# Patient Record
Sex: MALE | Race: WHITE | Employment: OTHER | URBAN - NONMETROPOLITAN AREA
[De-identification: names, ages, dates, MRNs, and addresses within clinical notes are randomized per-mention and may not be internally consistent; named-entity substitution may affect disease eponyms.]

---

## 2017-05-30 ENCOUNTER — OFFICE VISIT (OUTPATIENT)
Dept: SURGERY | Age: 42
End: 2017-05-30
Payer: MEDICAID

## 2017-05-30 VITALS
DIASTOLIC BLOOD PRESSURE: 80 MMHG | WEIGHT: 259 LBS | HEIGHT: 71 IN | TEMPERATURE: 98.4 F | SYSTOLIC BLOOD PRESSURE: 136 MMHG | BODY MASS INDEX: 36.26 KG/M2

## 2017-05-30 DIAGNOSIS — R19.00 ABDOMINAL WALL BULGE: Primary | ICD-10-CM

## 2017-05-30 PROCEDURE — 99202 OFFICE O/P NEW SF 15 MIN: CPT | Performed by: PHYSICIAN ASSISTANT

## 2017-05-30 RX ORDER — LOSARTAN POTASSIUM 50 MG/1
TABLET ORAL
Refills: 3 | COMMUNITY
Start: 2017-05-07 | End: 2019-01-16

## 2017-06-05 ASSESSMENT — ENCOUNTER SYMPTOMS
SHORTNESS OF BREATH: 0
CONSTIPATION: 0
WHEEZING: 0
ABDOMINAL DISTENTION: 1
ABDOMINAL PAIN: 1
NAUSEA: 0
DIARRHEA: 0
VOMITING: 0
ALLERGIC/IMMUNOLOGIC NEGATIVE: 1
EYES NEGATIVE: 1
APNEA: 1

## 2018-06-15 ENCOUNTER — TRANSCRIBE ORDERS (OUTPATIENT)
Dept: ADMINISTRATIVE | Facility: HOSPITAL | Age: 43
End: 2018-06-15

## 2018-06-15 ENCOUNTER — HOSPITAL ENCOUNTER (OUTPATIENT)
Dept: ULTRASOUND IMAGING | Facility: HOSPITAL | Age: 43
Discharge: HOME | End: 2018-06-15
Admitting: NURSE PRACTITIONER

## 2018-06-15 DIAGNOSIS — I15.9 SECONDARY HYPERTENSION: Primary | ICD-10-CM

## 2018-06-15 DIAGNOSIS — I15.9 SECONDARY HYPERTENSION: ICD-10-CM

## 2018-06-15 PROCEDURE — 76775 US EXAM ABDO BACK WALL LIM: CPT

## 2018-10-09 ENCOUNTER — HOSPITAL ENCOUNTER (OUTPATIENT)
Dept: CT IMAGING | Facility: HOSPITAL | Age: 43
Discharge: HOME | End: 2018-10-09

## 2018-10-09 ENCOUNTER — TRANSCRIBE ORDERS (OUTPATIENT)
Dept: ADMINISTRATIVE | Facility: HOSPITAL | Age: 43
End: 2018-10-09

## 2018-10-09 ENCOUNTER — HOSPITAL ENCOUNTER (OUTPATIENT)
Dept: ULTRASOUND IMAGING | Facility: HOSPITAL | Age: 43
Discharge: HOME | End: 2018-10-09
Admitting: NURSE PRACTITIONER

## 2018-10-09 DIAGNOSIS — R22.1 NECK MASS: ICD-10-CM

## 2018-10-09 DIAGNOSIS — R22.1 MASS IN NECK: ICD-10-CM

## 2018-10-09 DIAGNOSIS — R22.1 MASS IN NECK: Primary | ICD-10-CM

## 2018-10-09 LAB — CREAT BLDA-MCNC: 0.9 MG/DL (ref 0.6–1.3)

## 2018-10-09 PROCEDURE — 82565 ASSAY OF CREATININE: CPT

## 2018-10-09 PROCEDURE — 70491 CT SOFT TISSUE NECK W/DYE: CPT

## 2018-10-09 PROCEDURE — 25010000002 IOPAMIDOL 61 % SOLUTION: Performed by: NURSE PRACTITIONER

## 2018-10-09 PROCEDURE — 76536 US EXAM OF HEAD AND NECK: CPT

## 2018-10-09 RX ADMIN — IOPAMIDOL 100 ML: 612 INJECTION, SOLUTION INTRAVENOUS at 11:27

## 2019-01-08 ENCOUNTER — TELEPHONE (OUTPATIENT)
Dept: OTOLARYNGOLOGY | Age: 44
End: 2019-01-08

## 2019-01-11 ENCOUNTER — TELEPHONE (OUTPATIENT)
Dept: OTOLARYNGOLOGY | Age: 44
End: 2019-01-11

## 2019-01-16 ENCOUNTER — OFFICE VISIT (OUTPATIENT)
Dept: OTOLARYNGOLOGY | Age: 44
End: 2019-01-16
Payer: MEDICAID

## 2019-01-16 VITALS
SYSTOLIC BLOOD PRESSURE: 122 MMHG | HEIGHT: 71 IN | TEMPERATURE: 98 F | RESPIRATION RATE: 18 BRPM | DIASTOLIC BLOOD PRESSURE: 78 MMHG | HEART RATE: 90 BPM | BODY MASS INDEX: 35.56 KG/M2 | OXYGEN SATURATION: 94 % | WEIGHT: 254 LBS

## 2019-01-16 DIAGNOSIS — Q18.2 BRANCHIAL CLEFT: ICD-10-CM

## 2019-01-16 DIAGNOSIS — R22.1 MASS OF LEFT SIDE OF NECK: Primary | ICD-10-CM

## 2019-01-16 PROCEDURE — 99204 OFFICE O/P NEW MOD 45 MIN: CPT | Performed by: OTOLARYNGOLOGY

## 2019-01-16 RX ORDER — AMLODIPINE BESYLATE 5 MG/1
TABLET ORAL
Refills: 5 | COMMUNITY
Start: 2018-12-22

## 2019-01-16 RX ORDER — LOSARTAN POTASSIUM AND HYDROCHLOROTHIAZIDE 12.5; 5 MG/1; MG/1
TABLET ORAL
Refills: 5 | COMMUNITY
Start: 2018-12-21

## 2019-01-29 ENCOUNTER — HOSPITAL ENCOUNTER (OUTPATIENT)
Dept: ULTRASOUND IMAGING | Age: 44
Discharge: HOME | End: 2019-01-29
Payer: MEDICAID

## 2019-01-29 DIAGNOSIS — R22.1 MASS OF LEFT SIDE OF NECK: ICD-10-CM

## 2019-01-29 PROCEDURE — 88305 TISSUE EXAM BY PATHOLOGIST: CPT

## 2019-01-29 PROCEDURE — 88172 CYTP DX EVAL FNA 1ST EA SITE: CPT

## 2019-01-29 PROCEDURE — 2720000000 US FINE NEEDLE ASPIRATION

## 2019-01-29 PROCEDURE — 88173 CYTOPATH EVAL FNA REPORT: CPT

## 2019-01-29 PROCEDURE — 88334 PATH CONSLTJ SURG CYTO XM EA: CPT

## 2019-01-29 PROCEDURE — 88177 CYTP FNA EVAL EA ADDL: CPT

## 2019-01-29 PROCEDURE — 88333 PATH CONSLTJ SURG CYTO XM 1: CPT

## 2019-01-29 PROCEDURE — 88329 PATH CONSLTJ DRG SURG: CPT

## 2019-02-06 ENCOUNTER — OFFICE VISIT (OUTPATIENT)
Dept: OTOLARYNGOLOGY | Age: 44
End: 2019-02-06
Payer: MEDICAID

## 2019-02-06 VITALS
HEIGHT: 71 IN | HEART RATE: 45 BPM | OXYGEN SATURATION: 98 % | SYSTOLIC BLOOD PRESSURE: 106 MMHG | WEIGHT: 254 LBS | RESPIRATION RATE: 18 BRPM | TEMPERATURE: 97.8 F | BODY MASS INDEX: 35.56 KG/M2 | DIASTOLIC BLOOD PRESSURE: 64 MMHG

## 2019-02-06 DIAGNOSIS — R22.1 MASS OF LEFT SIDE OF NECK: Primary | ICD-10-CM

## 2019-02-06 DIAGNOSIS — Z85.820 HISTORY OF MELANOMA: ICD-10-CM

## 2019-02-06 PROCEDURE — 99212 OFFICE O/P EST SF 10 MIN: CPT | Performed by: OTOLARYNGOLOGY

## 2019-02-11 ENCOUNTER — ANESTHESIA EVENT (OUTPATIENT)
Dept: OPERATING ROOM | Age: 44
End: 2019-02-11

## 2019-02-11 ENCOUNTER — HOSPITAL ENCOUNTER (OUTPATIENT)
Dept: PREADMISSION TESTING | Age: 44
Setting detail: OUTPATIENT SURGERY
Discharge: HOME | End: 2019-02-15

## 2019-02-11 ENCOUNTER — HOSPITAL ENCOUNTER (OUTPATIENT)
Dept: LAB | Age: 44
Discharge: HOME | End: 2019-02-11
Payer: MEDICAID

## 2019-02-11 VITALS — WEIGHT: 253 LBS | HEIGHT: 71 IN | BODY MASS INDEX: 35.42 KG/M2

## 2019-02-11 LAB
ANION GAP SERPL CALCULATED.3IONS-SCNC: 11 MMOL/L (ref 7–19)
BASOPHILS ABSOLUTE: 0.1 K/UL (ref 0–0.2)
BASOPHILS RELATIVE PERCENT: 0.7 % (ref 0–1)
BUN BLDV-MCNC: 15 MG/DL (ref 6–20)
CALCIUM SERPL-MCNC: 8.7 MG/DL (ref 8.6–10)
CHLORIDE BLD-SCNC: 101 MMOL/L (ref 98–111)
CO2: 27 MMOL/L (ref 22–29)
CREAT SERPL-MCNC: 0.8 MG/DL (ref 0.5–1.2)
EOSINOPHILS ABSOLUTE: 0.1 K/UL (ref 0–0.6)
EOSINOPHILS RELATIVE PERCENT: 1.7 % (ref 0–5)
GFR NON-AFRICAN AMERICAN: >60
GLUCOSE BLD-MCNC: 139 MG/DL (ref 74–109)
HCT VFR BLD CALC: 44.6 % (ref 42–52)
HEMOGLOBIN: 14.4 G/DL (ref 14–18)
LYMPHOCYTES ABSOLUTE: 1.3 K/UL (ref 1.1–4.5)
LYMPHOCYTES RELATIVE PERCENT: 18.2 % (ref 20–40)
MCH RBC QN AUTO: 28.5 PG (ref 27–31)
MCHC RBC AUTO-ENTMCNC: 32.3 G/DL (ref 33–37)
MCV RBC AUTO: 88.1 FL (ref 80–94)
MONOCYTES ABSOLUTE: 0.8 K/UL (ref 0–0.9)
MONOCYTES RELATIVE PERCENT: 10.9 % (ref 0–10)
NEUTROPHILS ABSOLUTE: 4.8 K/UL (ref 1.5–7.5)
NEUTROPHILS RELATIVE PERCENT: 68.1 % (ref 50–65)
PDW BLD-RTO: 13.3 % (ref 11.5–14.5)
PLATELET # BLD: 300 K/UL (ref 130–400)
PMV BLD AUTO: 10.1 FL (ref 9.4–12.4)
POTASSIUM SERPL-SCNC: 4.1 MMOL/L (ref 3.5–5)
RBC # BLD: 5.06 M/UL (ref 4.7–6.1)
SODIUM BLD-SCNC: 139 MMOL/L (ref 136–145)
WBC # BLD: 7 K/UL (ref 4.8–10.8)

## 2019-02-12 ENCOUNTER — HOSPITAL ENCOUNTER (OUTPATIENT)
Age: 44
Setting detail: OUTPATIENT SURGERY
Discharge: HOME | End: 2019-02-12
Attending: OTOLARYNGOLOGY | Admitting: OTOLARYNGOLOGY
Payer: MEDICAID

## 2019-02-12 ENCOUNTER — HOSPITAL ENCOUNTER (OUTPATIENT)
Dept: NON INVASIVE DIAGNOSTICS | Age: 44
Discharge: HOME | End: 2019-02-12
Payer: MEDICAID

## 2019-02-12 ENCOUNTER — ANESTHESIA (OUTPATIENT)
Dept: OPERATING ROOM | Age: 44
End: 2019-02-12

## 2019-02-12 VITALS
BODY MASS INDEX: 35.42 KG/M2 | DIASTOLIC BLOOD PRESSURE: 83 MMHG | WEIGHT: 253 LBS | HEART RATE: 74 BPM | SYSTOLIC BLOOD PRESSURE: 124 MMHG | RESPIRATION RATE: 16 BRPM | OXYGEN SATURATION: 96 % | HEIGHT: 71 IN | TEMPERATURE: 98 F

## 2019-02-12 VITALS
OXYGEN SATURATION: 98 % | SYSTOLIC BLOOD PRESSURE: 108 MMHG | DIASTOLIC BLOOD PRESSURE: 82 MMHG | RESPIRATION RATE: 3 BRPM | TEMPERATURE: 98.6 F

## 2019-02-12 DIAGNOSIS — Z98.890 POST-OPERATIVE STATE: Primary | ICD-10-CM

## 2019-02-12 PROCEDURE — G8916 PT W IV AB GIVEN ON TIME: HCPCS

## 2019-02-12 PROCEDURE — 21556 EXC NECK TUM DEEP < 5 CM: CPT | Performed by: OTOLARYNGOLOGY

## 2019-02-12 PROCEDURE — 21552 EXC NECK LES SC 3 CM/>: CPT

## 2019-02-12 PROCEDURE — 93005 ELECTROCARDIOGRAM TRACING: CPT

## 2019-02-12 PROCEDURE — G8907 PT DOC NO EVENTS ON DISCHARG: HCPCS

## 2019-02-12 RX ORDER — HYDROMORPHONE HCL 110MG/55ML
0.5 PATIENT CONTROLLED ANALGESIA SYRINGE INTRAVENOUS EVERY 5 MIN PRN
Status: DISCONTINUED | OUTPATIENT
Start: 2019-02-12 | End: 2019-02-12 | Stop reason: HOSPADM

## 2019-02-12 RX ORDER — HYDROMORPHONE HCL 110MG/55ML
0.25 PATIENT CONTROLLED ANALGESIA SYRINGE INTRAVENOUS EVERY 5 MIN PRN
Status: DISCONTINUED | OUTPATIENT
Start: 2019-02-12 | End: 2019-02-12 | Stop reason: HOSPADM

## 2019-02-12 RX ORDER — PROMETHAZINE HYDROCHLORIDE 25 MG/ML
6.25 INJECTION, SOLUTION INTRAMUSCULAR; INTRAVENOUS
Status: DISCONTINUED | OUTPATIENT
Start: 2019-02-12 | End: 2019-02-12 | Stop reason: HOSPADM

## 2019-02-12 RX ORDER — MORPHINE SULFATE 10 MG/ML
2 INJECTION, SOLUTION INTRAMUSCULAR; INTRAVENOUS EVERY 5 MIN PRN
Status: DISCONTINUED | OUTPATIENT
Start: 2019-02-12 | End: 2019-02-12 | Stop reason: HOSPADM

## 2019-02-12 RX ORDER — MORPHINE SULFATE 10 MG/ML
4 INJECTION, SOLUTION INTRAMUSCULAR; INTRAVENOUS EVERY 5 MIN PRN
Status: DISCONTINUED | OUTPATIENT
Start: 2019-02-12 | End: 2019-02-12 | Stop reason: HOSPADM

## 2019-02-12 RX ORDER — MEPERIDINE HYDROCHLORIDE 25 MG/ML
12.5 INJECTION INTRAMUSCULAR; INTRAVENOUS; SUBCUTANEOUS EVERY 5 MIN PRN
Status: DISCONTINUED | OUTPATIENT
Start: 2019-02-12 | End: 2019-02-12 | Stop reason: HOSPADM

## 2019-02-12 RX ORDER — FENTANYL CITRATE 50 UG/ML
INJECTION, SOLUTION INTRAMUSCULAR; INTRAVENOUS PRN
Status: DISCONTINUED | OUTPATIENT
Start: 2019-02-12 | End: 2019-02-12 | Stop reason: SDUPTHER

## 2019-02-12 RX ORDER — SODIUM CHLORIDE, SODIUM LACTATE, POTASSIUM CHLORIDE, CALCIUM CHLORIDE 600; 310; 30; 20 MG/100ML; MG/100ML; MG/100ML; MG/100ML
INJECTION, SOLUTION INTRAVENOUS CONTINUOUS
Status: DISCONTINUED | OUTPATIENT
Start: 2019-02-12 | End: 2019-02-12 | Stop reason: HOSPADM

## 2019-02-12 RX ORDER — ROCURONIUM BROMIDE 10 MG/ML
INJECTION, SOLUTION INTRAVENOUS PRN
Status: DISCONTINUED | OUTPATIENT
Start: 2019-02-12 | End: 2019-02-12 | Stop reason: SDUPTHER

## 2019-02-12 RX ORDER — HYDRALAZINE HYDROCHLORIDE 20 MG/ML
5 INJECTION INTRAMUSCULAR; INTRAVENOUS EVERY 10 MIN PRN
Status: DISCONTINUED | OUTPATIENT
Start: 2019-02-12 | End: 2019-02-12 | Stop reason: HOSPADM

## 2019-02-12 RX ORDER — LIDOCAINE HYDROCHLORIDE 10 MG/ML
1 INJECTION, SOLUTION EPIDURAL; INFILTRATION; INTRACAUDAL; PERINEURAL
Status: COMPLETED | OUTPATIENT
Start: 2019-02-12 | End: 2019-02-12

## 2019-02-12 RX ORDER — HYDROCODONE BITARTRATE AND ACETAMINOPHEN 7.5; 325 MG/1; MG/1
1 TABLET ORAL EVERY 6 HOURS PRN
Qty: 24 TABLET | Refills: 0 | Status: SHIPPED | OUTPATIENT
Start: 2019-02-12 | End: 2019-02-17

## 2019-02-12 RX ORDER — LIDOCAINE HYDROCHLORIDE AND EPINEPHRINE 10; 10 MG/ML; UG/ML
INJECTION, SOLUTION INFILTRATION; PERINEURAL PRN
Status: DISCONTINUED | OUTPATIENT
Start: 2019-02-12 | End: 2019-02-12 | Stop reason: ALTCHOICE

## 2019-02-12 RX ORDER — ONDANSETRON 2 MG/ML
INJECTION INTRAMUSCULAR; INTRAVENOUS PRN
Status: DISCONTINUED | OUTPATIENT
Start: 2019-02-12 | End: 2019-02-12 | Stop reason: SDUPTHER

## 2019-02-12 RX ORDER — GLYCOPYRROLATE 0.6MG/3ML
SYRINGE (ML) INTRAVENOUS PRN
Status: DISCONTINUED | OUTPATIENT
Start: 2019-02-12 | End: 2019-02-12 | Stop reason: SDUPTHER

## 2019-02-12 RX ORDER — PROPOFOL 10 MG/ML
INJECTION, EMULSION INTRAVENOUS PRN
Status: DISCONTINUED | OUTPATIENT
Start: 2019-02-12 | End: 2019-02-12 | Stop reason: SDUPTHER

## 2019-02-12 RX ORDER — NEOSTIGMINE METHYLSULFATE 5 MG/5 ML
SYRINGE (ML) INTRAVENOUS PRN
Status: DISCONTINUED | OUTPATIENT
Start: 2019-02-12 | End: 2019-02-12 | Stop reason: SDUPTHER

## 2019-02-12 RX ORDER — EPHEDRINE SULFATE 50 MG/ML
INJECTION, SOLUTION INTRAVENOUS PRN
Status: DISCONTINUED | OUTPATIENT
Start: 2019-02-12 | End: 2019-02-12 | Stop reason: SDUPTHER

## 2019-02-12 RX ORDER — LABETALOL HYDROCHLORIDE 5 MG/ML
5 INJECTION, SOLUTION INTRAVENOUS EVERY 10 MIN PRN
Status: DISCONTINUED | OUTPATIENT
Start: 2019-02-12 | End: 2019-02-12 | Stop reason: HOSPADM

## 2019-02-12 RX ORDER — DIPHENHYDRAMINE HYDROCHLORIDE 50 MG/ML
12.5 INJECTION INTRAMUSCULAR; INTRAVENOUS
Status: DISCONTINUED | OUTPATIENT
Start: 2019-02-12 | End: 2019-02-12 | Stop reason: HOSPADM

## 2019-02-12 RX ORDER — DEXAMETHASONE SODIUM PHOSPHATE 4 MG/ML
INJECTION, SOLUTION INTRA-ARTICULAR; INTRALESIONAL; INTRAMUSCULAR; INTRAVENOUS; SOFT TISSUE PRN
Status: DISCONTINUED | OUTPATIENT
Start: 2019-02-12 | End: 2019-02-12 | Stop reason: SDUPTHER

## 2019-02-12 RX ORDER — MIDAZOLAM HYDROCHLORIDE 1 MG/ML
INJECTION INTRAMUSCULAR; INTRAVENOUS PRN
Status: DISCONTINUED | OUTPATIENT
Start: 2019-02-12 | End: 2019-02-12 | Stop reason: SDUPTHER

## 2019-02-12 RX ORDER — METOCLOPRAMIDE HYDROCHLORIDE 5 MG/ML
10 INJECTION INTRAMUSCULAR; INTRAVENOUS
Status: DISCONTINUED | OUTPATIENT
Start: 2019-02-12 | End: 2019-02-12 | Stop reason: HOSPADM

## 2019-02-12 RX ADMIN — PROPOFOL 200 MG: 10 INJECTION, EMULSION INTRAVENOUS at 08:53

## 2019-02-12 RX ADMIN — Medication 0.5 MG: at 11:22

## 2019-02-12 RX ADMIN — FENTANYL CITRATE 50 MCG: 50 INJECTION, SOLUTION INTRAMUSCULAR; INTRAVENOUS at 08:51

## 2019-02-12 RX ADMIN — MIDAZOLAM HYDROCHLORIDE 2 MG: 1 INJECTION INTRAMUSCULAR; INTRAVENOUS at 08:45

## 2019-02-12 RX ADMIN — LIDOCAINE HYDROCHLORIDE 50 MG: 10 INJECTION, SOLUTION EPIDURAL; INFILTRATION; INTRACAUDAL; PERINEURAL at 08:51

## 2019-02-12 RX ADMIN — ROCURONIUM BROMIDE 25 MG: 10 INJECTION, SOLUTION INTRAVENOUS at 08:53

## 2019-02-12 RX ADMIN — DEXAMETHASONE SODIUM PHOSPHATE 4 MG: 4 INJECTION, SOLUTION INTRA-ARTICULAR; INTRALESIONAL; INTRAMUSCULAR; INTRAVENOUS; SOFT TISSUE at 09:39

## 2019-02-12 RX ADMIN — ONDANSETRON 4 MG: 2 INJECTION INTRAMUSCULAR; INTRAVENOUS at 10:19

## 2019-02-12 RX ADMIN — Medication 0.4 MG: at 10:30

## 2019-02-12 RX ADMIN — SODIUM CHLORIDE, SODIUM LACTATE, POTASSIUM CHLORIDE, CALCIUM CHLORIDE: 600; 310; 30; 20 INJECTION, SOLUTION INTRAVENOUS at 06:58

## 2019-02-12 RX ADMIN — FENTANYL CITRATE 50 MCG: 50 INJECTION, SOLUTION INTRAMUSCULAR; INTRAVENOUS at 09:38

## 2019-02-12 RX ADMIN — SODIUM CHLORIDE, SODIUM LACTATE, POTASSIUM CHLORIDE, CALCIUM CHLORIDE: 600; 310; 30; 20 INJECTION, SOLUTION INTRAVENOUS at 09:35

## 2019-02-12 RX ADMIN — EPHEDRINE SULFATE 10 MG: 50 INJECTION, SOLUTION INTRAVENOUS at 09:19

## 2019-02-12 RX ADMIN — Medication 0.5 MG: at 11:08

## 2019-02-12 RX ADMIN — SODIUM CHLORIDE, SODIUM LACTATE, POTASSIUM CHLORIDE, CALCIUM CHLORIDE: 600; 310; 30; 20 INJECTION, SOLUTION INTRAVENOUS at 10:00

## 2019-02-12 RX ADMIN — Medication 3 MG: at 10:30

## 2019-02-12 RX ADMIN — ROCURONIUM BROMIDE 5 MG: 10 INJECTION, SOLUTION INTRAVENOUS at 08:52

## 2019-02-12 ASSESSMENT — PAIN SCALES - GENERAL
PAINLEVEL_OUTOF10: 0
PAINLEVEL_OUTOF10: 6
PAINLEVEL_OUTOF10: 0
PAINLEVEL_OUTOF10: 8
PAINLEVEL_OUTOF10: 8
PAINLEVEL_OUTOF10: 4

## 2019-02-14 ENCOUNTER — OFFICE VISIT (OUTPATIENT)
Dept: OTOLARYNGOLOGY | Age: 44
End: 2019-02-14

## 2019-02-14 VITALS
RESPIRATION RATE: 18 BRPM | SYSTOLIC BLOOD PRESSURE: 114 MMHG | TEMPERATURE: 98.2 F | DIASTOLIC BLOOD PRESSURE: 74 MMHG | OXYGEN SATURATION: 98 % | HEIGHT: 71 IN | HEART RATE: 81 BPM | BODY MASS INDEX: 35.29 KG/M2

## 2019-02-14 DIAGNOSIS — R22.1 MASS OF LEFT SIDE OF NECK: Primary | ICD-10-CM

## 2019-02-14 DIAGNOSIS — Z98.890 POST-OPERATIVE STATE: ICD-10-CM

## 2019-02-14 PROCEDURE — 99024 POSTOP FOLLOW-UP VISIT: CPT | Performed by: OTOLARYNGOLOGY

## 2019-02-17 LAB
ANAEROBIC CULTURE: NORMAL
CULTURE SURGICAL: NORMAL
GRAM STAIN RESULT: NORMAL

## 2020-02-24 ENCOUNTER — TRANSCRIBE ORDERS (OUTPATIENT)
Dept: ADMINISTRATIVE | Facility: HOSPITAL | Age: 45
End: 2020-02-24

## 2020-02-24 ENCOUNTER — HOSPITAL ENCOUNTER (OUTPATIENT)
Dept: GENERAL RADIOLOGY | Facility: HOSPITAL | Age: 45
Discharge: HOME | End: 2020-02-24
Admitting: NURSE PRACTITIONER

## 2020-02-24 DIAGNOSIS — R06.2 WHEEZING: ICD-10-CM

## 2020-02-24 DIAGNOSIS — R05.9 COUGH: ICD-10-CM

## 2020-02-24 DIAGNOSIS — J06.9 ACUTE RESPIRATORY DISEASE: Primary | ICD-10-CM

## 2020-02-24 PROCEDURE — 71046 X-RAY EXAM CHEST 2 VIEWS: CPT

## 2021-12-15 ENCOUNTER — OFFICE VISIT (OUTPATIENT)
Dept: INTERNAL MEDICINE | Facility: CLINIC | Age: 46
End: 2021-12-15

## 2021-12-15 VITALS
BODY MASS INDEX: 35.28 KG/M2 | HEART RATE: 76 BPM | OXYGEN SATURATION: 98 % | WEIGHT: 252 LBS | RESPIRATION RATE: 16 BRPM | DIASTOLIC BLOOD PRESSURE: 94 MMHG | TEMPERATURE: 98.4 F | SYSTOLIC BLOOD PRESSURE: 141 MMHG | HEIGHT: 71 IN

## 2021-12-15 DIAGNOSIS — M79.632 LEFT FOREARM PAIN: Primary | ICD-10-CM

## 2021-12-15 DIAGNOSIS — L98.9 SKIN LESION: ICD-10-CM

## 2021-12-15 PROCEDURE — 99213 OFFICE O/P EST LOW 20 MIN: CPT

## 2021-12-15 RX ORDER — AMLODIPINE BESYLATE 5 MG/1
TABLET ORAL
COMMUNITY
Start: 2021-11-28 | End: 2022-04-01 | Stop reason: SDUPTHER

## 2021-12-15 RX ORDER — FLUTICASONE PROPIONATE 50 MCG
SPRAY, SUSPENSION (ML) NASAL
COMMUNITY
End: 2022-09-30 | Stop reason: SDUPTHER

## 2021-12-15 RX ORDER — VALSARTAN AND HYDROCHLOROTHIAZIDE 80; 12.5 MG/1; MG/1
TABLET, FILM COATED ORAL
COMMUNITY
Start: 2021-10-22 | End: 2022-04-01 | Stop reason: SDUPTHER

## 2021-12-15 NOTE — PROGRESS NOTES
Subjective     Chief Complaint   Patient presents with   • Arm Pain     left       History of Present Illness  Patient is a 46 year old male who presents with left forearm pain. Describes pain as sharp pain and is worse when he extends it out reaching for something. Is not going away. Denies any injury to arm, is not sore to touch. Very low pain when sitting. Pain radiates down into his wrist. Has full range of motion.  Denies any weakness in left hand or arm. No  numbness or tingling in fingers, hand, or wrist reported.    Patient request a referral to dermatology, informed patient that he did not need a referral he could call on his own, however, patient still asked if referral can be placed.  He states that he has a couple areas that he would like to be assessed by dermatologist and would also like to establish care with one of them for routine skin assessments due to history of skin cancer.     Patient's PMR from outside medical facility reviewed and noted.    Review of Systems   Constitutional: Negative for activity change, chills and fever.   HENT: Negative for congestion, ear pain, rhinorrhea, sinus pressure, sinus pain, sneezing, sore throat and trouble swallowing.    Respiratory: Negative for cough, shortness of breath and wheezing.    Cardiovascular: Negative for chest pain.   Gastrointestinal: Negative for abdominal distention, abdominal pain, blood in stool, constipation, diarrhea, nausea and rectal pain.   Genitourinary: Negative for decreased urine volume, difficulty urinating and hematuria.   Musculoskeletal:        Pain below left anticubital area that radiates down to med forearm.    Neurological: Negative for dizziness, tremors, weakness and numbness.   Psychiatric/Behavioral: Negative for confusion and dysphoric mood. The patient is not nervous/anxious and is not hyperactive.         Otherwise complete ROS reviewed and negative except as mentioned in the HPI.    Past Medical History:  "  Past Medical History:   Diagnosis Date   • Hypertension    • Skin cancer      Past Surgical History:History reviewed. No pertinent surgical history.  Social History:  reports that he has quit smoking. His smoking use included cigarettes. He has a 9.00 pack-year smoking history. He has quit using smokeless tobacco. He reports current drug use. Drug: Marijuana. He reports that he does not drink alcohol.    Family History: family history includes Diabetes in his mother; Hypertension in his mother.      Allergies:  Allergies   Allergen Reactions   • Lisinopril Other (See Comments)     hypotension     Medications:  Prior to Admission medications    Medication Sig Start Date End Date Taking? Authorizing Provider   amLODIPine (NORVASC) 5 MG tablet  11/28/21  Yes Provider, MD Pavel   fluticasone (FLONASE) 50 MCG/ACT nasal spray fluticasone propionate 50 mcg/actuation nasal spray,suspension   Yes Provider, MD Pavel   valsartan-hydrochlorothiazide (DIOVAN-HCT) 80-12.5 MG per tablet  10/22/21  Yes Provider, MD Pavel       Objective     Vital Signs: /94 (BP Location: Right arm, Patient Position: Sitting, Cuff Size: Large Adult)   Pulse 76   Temp 98.4 °F (36.9 °C) (Infrared)   Resp 16   Ht 180.3 cm (71\")   Wt 114 kg (252 lb)   SpO2 98%   BMI 35.15 kg/m²   Physical Exam  Constitutional:       General: He is not in acute distress.     Appearance: Normal appearance. He is normal weight. He is not ill-appearing.   HENT:      Head: Normocephalic and atraumatic.      Nose: Nose normal.   Eyes:      General: No scleral icterus.        Right eye: No discharge.         Left eye: No discharge.      Conjunctiva/sclera: Conjunctivae normal.   Cardiovascular:      Rate and Rhythm: Normal rate and regular rhythm.      Pulses: Normal pulses.      Heart sounds: Normal heart sounds. No murmur heard.      Pulmonary:      Effort: Pulmonary effort is normal. No respiratory distress.      Breath sounds: Normal breath " sounds. No wheezing.   Abdominal:      General: Bowel sounds are normal.      Palpations: Abdomen is soft.      Tenderness: There is no abdominal tenderness.   Musculoskeletal:         General: Tenderness present. No swelling or signs of injury. Normal range of motion.      Cervical back: Normal range of motion and neck supple.      Comments: No edema noted in left upper extremity.  When palpating left mid upper forearm, noted textural difference in comparison to right.  reflexes intact.   Skin:     General: Skin is warm.      Capillary Refill: Capillary refill takes less than 2 seconds.      Findings: Lesion present. No erythema.      Comments: Patient has small tan/brown irregular bordered and open lesion present on right lower leg that has been there for couple months.   Neurological:      General: No focal deficit present.      Mental Status: He is alert and oriented to person, place, and time. Mental status is at baseline.   Psychiatric:         Mood and Affect: Mood normal.         Behavior: Behavior normal.       Patient's Body mass index is 35.15 kg/m². indicating that he is obese (BMI >30). Obesity-related health conditions include the following: hypertension. Obesity is unchanged. BMI is is above average; no BMI management plan is appropriate.     Results Reviewed:  Glucose   Date Value Ref Range Status   02/11/2019 139 (H) 74 - 109 mg/dL Final     BUN   Date Value Ref Range Status   02/11/2019 15 6 - 20 mg/dL Final     Creatinine   Date Value Ref Range Status   02/11/2019 0.8 0.5 - 1.2 mg/dL Final     Sodium   Date Value Ref Range Status   02/11/2019 139 136 - 145 mmol/L Final     Potassium   Date Value Ref Range Status   02/11/2019 4.1 3.5 - 5.0 mmol/L Final     Chloride   Date Value Ref Range Status   02/11/2019 101 98 - 111 mmol/L Final     CO2   Date Value Ref Range Status   02/11/2019 27 22 - 29 mmol/L Final     Calcium   Date Value Ref Range Status   02/11/2019 8.7 8.6 - 10.0 mg/dL Final     ALT  (SGPT)   Date Value Ref Range Status   12/21/2015 42 0 - 54 Units/L Final     AST (SGOT)   Date Value Ref Range Status   12/21/2015 31 7 - 45 Units/L Final     WBC   Date Value Ref Range Status   02/11/2019 7.0 4.8 - 10.8 K/uL Final     Hematocrit   Date Value Ref Range Status   02/11/2019 44.6 42.0 - 52.0 % Final     Platelets   Date Value Ref Range Status   02/11/2019 300 130 - 400 K/uL Final     Triglycerides   Date Value Ref Range Status   09/28/2014 164 (H) 0 - 149 mg/dL Final     HDL Cholesterol   Date Value Ref Range Status   09/28/2014 29 mg/dL Final     Comment:     DF by IF @ 09/28/2014 07:58  HDL Reference Range  Female: >50  Male: >40       LDL Cholesterol    Date Value Ref Range Status   09/28/2014 161 (H) 0 - 99 mg/dL Final     LDL/HDL Ratio   Date Value Ref Range Status   09/28/2014 5.6 (H) 0.0 - 3.4 Final   discussed ultrasound results with patient:     EXAMINATION: Venous duplex left upper extremity 12/15/2021     HISTORY: Left forearm pain     FINDINGS: Color duplex ultrasound was performed of the deep and  superficial venous system of the left upper extremity from the neck to  the distal forearm utilizing B-mode/grayscale imaging with spectral  analysis and color flow imaging. Compression and augmentation were also  utilized.     In the area of discomfort no sonographic abnormality could be  demonstrated.     Interrogation of the deep and superficial venous system of the left  upper extremity demonstrate good flow and compressibility without  evidence of deep or superficial venous thrombosis.     IMPRESSION:  1.. Normal venous duplex exam of the left upper extremity.        This report was finalized on 12/15/2021 09:25 by Dr. Bharath Ruvalcaba MD.      Assessment / Plan     Assessment/Plan:  1. Left forearm pain  - US Venous Doppler Upper Extremity Left (duplex)  - Ambulatory Referral to Orthopedic Surgery    2. Skin lesion  - Ambulatory Referral to Dermatology      Return in about 2 weeks (around  12/29/2021) for Recheck. unless patient needs to be seen sooner or acute issues arise.    Code Status: Full    I have discussed the patient results/orders and and plan/recommendation with them at today's visit.  Discussed with patient that possible cause of pain could be muscular, or related to ligament or tendon issue best suited to be managed by orthopedics to determine if additional imaging could be required. discussed supportive pain relief with tylenol or motrin, ice and heat    Kiara Rouse, APRN   12/15/2021

## 2022-04-01 ENCOUNTER — OFFICE VISIT (OUTPATIENT)
Dept: INTERNAL MEDICINE | Facility: CLINIC | Age: 47
End: 2022-04-01

## 2022-04-01 VITALS
DIASTOLIC BLOOD PRESSURE: 85 MMHG | SYSTOLIC BLOOD PRESSURE: 121 MMHG | BODY MASS INDEX: 35.48 KG/M2 | HEIGHT: 71 IN | RESPIRATION RATE: 18 BRPM | TEMPERATURE: 98.7 F | WEIGHT: 253.4 LBS | OXYGEN SATURATION: 96 % | HEART RATE: 84 BPM

## 2022-04-01 DIAGNOSIS — Z11.59 ENCOUNTER FOR HEPATITIS C SCREENING TEST FOR LOW RISK PATIENT: ICD-10-CM

## 2022-04-01 DIAGNOSIS — Z12.5 ENCOUNTER FOR PROSTATE CANCER SCREENING: ICD-10-CM

## 2022-04-01 DIAGNOSIS — E66.01 CLASS 2 SEVERE OBESITY DUE TO EXCESS CALORIES WITH SERIOUS COMORBIDITY AND BODY MASS INDEX (BMI) OF 39.0 TO 39.9 IN ADULT: ICD-10-CM

## 2022-04-01 DIAGNOSIS — Z13.1 ENCOUNTER FOR SCREENING FOR DIABETES MELLITUS: ICD-10-CM

## 2022-04-01 DIAGNOSIS — E78.2 MIXED HYPERLIPIDEMIA: ICD-10-CM

## 2022-04-01 DIAGNOSIS — I10 PRIMARY HYPERTENSION: ICD-10-CM

## 2022-04-01 DIAGNOSIS — R53.83 FATIGUE, UNSPECIFIED TYPE: ICD-10-CM

## 2022-04-01 DIAGNOSIS — Z00.00 ANNUAL PHYSICAL EXAM: Primary | ICD-10-CM

## 2022-04-01 PROCEDURE — 2014F MENTAL STATUS ASSESS: CPT

## 2022-04-01 PROCEDURE — 99396 PREV VISIT EST AGE 40-64: CPT

## 2022-04-01 PROCEDURE — 3008F BODY MASS INDEX DOCD: CPT

## 2022-04-01 RX ORDER — VALSARTAN AND HYDROCHLOROTHIAZIDE 80; 12.5 MG/1; MG/1
1 TABLET, FILM COATED ORAL DAILY
Qty: 90 TABLET | Refills: 1 | Status: SHIPPED | OUTPATIENT
Start: 2022-04-01 | End: 2022-11-23 | Stop reason: SDUPTHER

## 2022-04-01 RX ORDER — AMLODIPINE BESYLATE 5 MG/1
5 TABLET ORAL DAILY
Qty: 90 TABLET | Refills: 1 | Status: SHIPPED | OUTPATIENT
Start: 2022-04-01 | End: 2022-09-27 | Stop reason: SDUPTHER

## 2022-04-01 NOTE — PROGRESS NOTES
"        Subjective     Chief Complaint   Patient presents with   • Hypertension     Med refill.        History of Present Illness  Patient states he is here today of a medication refill. Has seen me once prior in december for acute problem. Would like to establish care today. No health concerns. Currently on amlodipine and diovan-hct, feels like he is doing well on those. Has been on these medications for awhile. Current BP today is 121/85.   Reviewed previous labs. Last lipid panel available for review was in 2014, pt unsure if had checked since. At this time  Patient is not currently on hyperlipidemia therapy. Unable to find any PSA assessments.   Patient denies any chest pain, shortness of breath.  Former smoker. Stopping 20 \"something\" years ago.   Patient's PMR from outside medical facility reviewed and noted.    Review of Systems   Constitutional: Negative for activity change, fatigue and unexpected weight change.   HENT: Negative for congestion, mouth sores, sore throat and trouble swallowing.    Eyes: Negative for discharge and visual disturbance.   Respiratory: Negative for cough and shortness of breath.    Cardiovascular: Negative for chest pain and leg swelling.   Gastrointestinal: Negative for abdominal pain, constipation, diarrhea, nausea and vomiting.   Genitourinary: Negative for decreased urine volume, difficulty urinating and hematuria.   Musculoskeletal: Negative for back pain and gait problem.   Skin: Negative for color change and rash.   Allergic/Immunologic: Negative for environmental allergies and immunocompromised state.   Neurological: Negative for weakness and headaches.   Psychiatric/Behavioral: Negative for confusion and sleep disturbance. The patient is not nervous/anxious.         Otherwise complete ROS reviewed and negative except as mentioned in the HPI.    Past Medical History:   Past Medical History:   Diagnosis Date   • Hypertension    • Skin cancer      Past Surgical " "History:History reviewed. No pertinent surgical history.  Social History:  reports that he quit smoking about 22 years ago. His smoking use included cigarettes. He has a 9.00 pack-year smoking history. He quit smokeless tobacco use about 17 years ago. He reports current drug use. Drug: Marijuana. He reports that he does not drink alcohol.    Family History: family history includes Diabetes in his mother; Hypertension in his mother.      Allergies:  Allergies   Allergen Reactions   • Lisinopril Other (See Comments)     hypotension     Medications:  Prior to Admission medications    Medication Sig Start Date End Date Taking? Authorizing Provider   amLODIPine (NORVASC) 5 MG tablet  11/28/21  Yes ProviderPavel MD   valsartan-hydrochlorothiazide (DIOVAN-HCT) 80-12.5 MG per tablet  10/22/21  Yes Provider, MD Pavel   fluticasone (FLONASE) 50 MCG/ACT nasal spray fluticasone propionate 50 mcg/actuation nasal spray,suspension    Provider, MD Pavel         Objective     Vital Signs: /85 (BP Location: Left arm, Patient Position: Sitting, Cuff Size: Adult)   Pulse 84   Temp 98.7 °F (37.1 °C) (Skin)   Resp 18   Ht 180.3 cm (71\")   Wt 115 kg (253 lb 6.4 oz)   SpO2 96%   BMI 35.34 kg/m²   Physical Exam  Constitutional:       General: He is not in acute distress.     Appearance: Normal appearance. He is normal weight. He is not ill-appearing.   HENT:      Head: Normocephalic and atraumatic.      Right Ear: External ear normal.      Left Ear: External ear normal.      Nose: Nose normal. No congestion or rhinorrhea.      Mouth/Throat:      Mouth: Mucous membranes are moist.      Pharynx: No posterior oropharyngeal erythema.   Eyes:      General: No scleral icterus.     Extraocular Movements: Extraocular movements intact.      Conjunctiva/sclera: Conjunctivae normal.      Pupils: Pupils are equal, round, and reactive to light.   Cardiovascular:      Rate and Rhythm: Normal rate and regular rhythm.      " Pulses: Normal pulses.      Heart sounds: Normal heart sounds.   Pulmonary:      Effort: Pulmonary effort is normal. No respiratory distress.      Breath sounds: Normal breath sounds. No wheezing.   Abdominal:      General: Abdomen is flat. Bowel sounds are normal.      Palpations: Abdomen is soft.      Tenderness: There is no abdominal tenderness.   Musculoskeletal:         General: Normal range of motion.      Cervical back: Normal range of motion.      Right lower leg: No edema.      Left lower leg: No edema.   Skin:     General: Skin is warm and dry.      Findings: No erythema or rash.   Neurological:      General: No focal deficit present.      Mental Status: He is alert and oriented to person, place, and time. Mental status is at baseline.      Motor: No weakness.   Psychiatric:         Mood and Affect: Mood normal.         Behavior: Behavior normal.         Thought Content: Thought content normal.         Judgment: Judgment normal.         Patient's Body mass index is 35.34 kg/m². indicating that he is morbidly obese (BMI > 40 or > 35 with obesity - related health condition). Obesity-related health conditions include the following: hypertension. Obesity is unchanged. BMI is is above average; no BMI management plan is appropriate. We discussed portion control and increasing exercise..      Results Reviewed:  Glucose   Date Value Ref Range Status   02/11/2019 139 (H) 74 - 109 mg/dL Final     BUN   Date Value Ref Range Status   02/11/2019 15 6 - 20 mg/dL Final     Creatinine   Date Value Ref Range Status   02/11/2019 0.8 0.5 - 1.2 mg/dL Final     Sodium   Date Value Ref Range Status   02/11/2019 139 136 - 145 mmol/L Final     Potassium   Date Value Ref Range Status   02/11/2019 4.1 3.5 - 5.0 mmol/L Final     Chloride   Date Value Ref Range Status   02/11/2019 101 98 - 111 mmol/L Final     CO2   Date Value Ref Range Status   02/11/2019 27 22 - 29 mmol/L Final     Calcium   Date Value Ref Range Status   02/11/2019  8.7 8.6 - 10.0 mg/dL Final     ALT (SGPT)   Date Value Ref Range Status   12/21/2015 42 0 - 54 Units/L Final     AST (SGOT)   Date Value Ref Range Status   12/21/2015 31 7 - 45 Units/L Final     WBC   Date Value Ref Range Status   02/11/2019 7.0 4.8 - 10.8 K/uL Final     Hematocrit   Date Value Ref Range Status   02/11/2019 44.6 42.0 - 52.0 % Final     Platelets   Date Value Ref Range Status   02/11/2019 300 130 - 400 K/uL Final     Triglycerides   Date Value Ref Range Status   09/28/2014 164 (H) 0 - 149 mg/dL Final     HDL Cholesterol   Date Value Ref Range Status   09/28/2014 29 mg/dL Final     Comment:     DF by IF @ 09/28/2014 07:58  HDL Reference Range  Female: >50  Male: >40       LDL Cholesterol    Date Value Ref Range Status   09/28/2014 161 (H) 0 - 99 mg/dL Final     LDL/HDL Ratio   Date Value Ref Range Status   09/28/2014 5.6 (H) 0.0 - 3.4 Final         Assessment / Plan     Assessment/Plan:  1. Mixed hyperlipidemia  - Lipid panel    2. Encounter for prostate cancer screening  - PSA SCREENING    3. Annual physical exam  - CBC w AUTO Differential  - Comprehensive metabolic panel    4. Encounter for hepatitis C screening test for low risk patient  - Hepatitis C Antibody    5. Primary hypertension  - valsartan-hydrochlorothiazide (DIOVAN-HCT) 80-12.5 MG per tablet; Take 1 tablet by mouth Daily.  Dispense: 90 tablet; Refill: 1  - amLODIPine (NORVASC) 5 MG tablet; Take 1 tablet by mouth Daily.  Dispense: 90 tablet; Refill: 1  -hypertension controlled on current medication regimen  6. Fatigue, unspecified type  - T4  - TSH    7. Encounter for screening for diabetes mellitus  - Hemoglobin A1c    8. Class 2 severe obesity due to excess calories with serious comorbidity and body mass index (BMI) of 39.0 to 39.9 in adult (HCC)        Return in about 3 months (around 7/1/2022) for Recheck. unless patient needs to be seen sooner or acute issues arise.      I have discussed the patient results/orders and and  plan/recommendation with them at today's visit.      Kiara Rouse, KWAME   04/01/2022

## 2022-04-02 LAB
ALBUMIN SERPL-MCNC: 4.2 G/DL (ref 4–5)
ALBUMIN/GLOB SERPL: 1.5 {RATIO} (ref 1.2–2.2)
ALP SERPL-CCNC: 95 IU/L (ref 44–121)
ALT SERPL-CCNC: 45 IU/L (ref 0–44)
AST SERPL-CCNC: 33 IU/L (ref 0–40)
BASOPHILS # BLD AUTO: 0.1 X10E3/UL (ref 0–0.2)
BASOPHILS NFR BLD AUTO: 1 %
BILIRUB SERPL-MCNC: <0.2 MG/DL (ref 0–1.2)
BUN SERPL-MCNC: 12 MG/DL (ref 6–24)
BUN/CREAT SERPL: 14 (ref 9–20)
CALCIUM SERPL-MCNC: 9.4 MG/DL (ref 8.7–10.2)
CHLORIDE SERPL-SCNC: 100 MMOL/L (ref 96–106)
CHOLEST SERPL-MCNC: 181 MG/DL (ref 100–199)
CO2 SERPL-SCNC: 22 MMOL/L (ref 20–29)
CREAT SERPL-MCNC: 0.85 MG/DL (ref 0.76–1.27)
EGFRCR SERPLBLD CKD-EPI 2021: 108 ML/MIN/1.73
EOSINOPHIL # BLD AUTO: 0.2 X10E3/UL (ref 0–0.4)
EOSINOPHIL NFR BLD AUTO: 4 %
ERYTHROCYTE [DISTWIDTH] IN BLOOD BY AUTOMATED COUNT: 13 % (ref 11.6–15.4)
GLOBULIN SER CALC-MCNC: 2.8 G/DL (ref 1.5–4.5)
GLUCOSE SERPL-MCNC: 174 MG/DL (ref 65–99)
HBA1C MFR BLD: 7.5 % (ref 4.8–5.6)
HCT VFR BLD AUTO: 46.3 % (ref 37.5–51)
HCV AB S/CO SERPL IA: 0.1 S/CO RATIO (ref 0–0.9)
HDLC SERPL-MCNC: 30 MG/DL
HGB BLD-MCNC: 15.4 G/DL (ref 13–17.7)
IMM GRANULOCYTES # BLD AUTO: 0 X10E3/UL (ref 0–0.1)
IMM GRANULOCYTES NFR BLD AUTO: 0 %
LDLC SERPL CALC-MCNC: 124 MG/DL (ref 0–99)
LYMPHOCYTES # BLD AUTO: 1.4 X10E3/UL (ref 0.7–3.1)
LYMPHOCYTES NFR BLD AUTO: 20 %
MCH RBC QN AUTO: 29.2 PG (ref 26.6–33)
MCHC RBC AUTO-ENTMCNC: 33.3 G/DL (ref 31.5–35.7)
MCV RBC AUTO: 88 FL (ref 79–97)
MONOCYTES # BLD AUTO: 0.5 X10E3/UL (ref 0.1–0.9)
MONOCYTES NFR BLD AUTO: 8 %
NEUTROPHILS # BLD AUTO: 4.5 X10E3/UL (ref 1.4–7)
NEUTROPHILS NFR BLD AUTO: 67 %
PLATELET # BLD AUTO: 305 X10E3/UL (ref 150–450)
POTASSIUM SERPL-SCNC: 4.4 MMOL/L (ref 3.5–5.2)
PROT SERPL-MCNC: 7 G/DL (ref 6–8.5)
PSA SERPL-MCNC: 0.3 NG/ML (ref 0–4)
RBC # BLD AUTO: 5.27 X10E6/UL (ref 4.14–5.8)
SODIUM SERPL-SCNC: 136 MMOL/L (ref 134–144)
T4 SERPL-MCNC: 5.9 UG/DL (ref 4.5–12)
TRIGL SERPL-MCNC: 152 MG/DL (ref 0–149)
TSH SERPL DL<=0.005 MIU/L-ACNC: 1.27 UIU/ML (ref 0.45–4.5)
VLDLC SERPL CALC-MCNC: 27 MG/DL (ref 5–40)
WBC # BLD AUTO: 6.7 X10E3/UL (ref 3.4–10.8)

## 2022-04-05 ENCOUNTER — TELEPHONE (OUTPATIENT)
Dept: INTERNAL MEDICINE | Facility: CLINIC | Age: 47
End: 2022-04-05

## 2022-04-05 DIAGNOSIS — E11.65 TYPE 2 DIABETES MELLITUS WITH HYPERGLYCEMIA, WITHOUT LONG-TERM CURRENT USE OF INSULIN: Primary | ICD-10-CM

## 2022-04-05 NOTE — PROGRESS NOTES
I tried to call patient to review his labs, if you try to give him another call this afternoon I would appreciate it.  He did not answer and no voicemail box set up when I called.  Please advise him that his CBC looks good.  Also advised that his CMP looks okay other than he did have an elevated glucose level.  Also advised that his cholesterol is within normal limits, however his triglycerides are still just a little bit elevated, please emphasize advised to focus on diet and exercise.  Thyroid and prostate also look good.  However advised that hemoglobin A1c is indicating 7.5 which means that patient is a type II diabetic at this point.  Advised that I have called in Metformin and antidiabetic medication that is taken by mouth to listed pharmacy.  Patient is to take this medication 1 pill or 500 mg twice a day every day and I would like to see them at next follow-up and we will recheck at this time.  Advised to focus on cutting out carbohydrates and sugars in diet and the importance of increasing exercise.  Thank you

## 2022-04-05 NOTE — TELEPHONE ENCOUNTER
Attempted to call patient to review results of lab work.  No answer.  Unable to leave voicemail due to voicemail box not being set up yet.

## 2022-06-27 ENCOUNTER — OFFICE VISIT (OUTPATIENT)
Dept: INTERNAL MEDICINE | Facility: CLINIC | Age: 47
End: 2022-06-27

## 2022-06-27 VITALS
HEART RATE: 80 BPM | WEIGHT: 256 LBS | HEIGHT: 71 IN | TEMPERATURE: 98.3 F | SYSTOLIC BLOOD PRESSURE: 117 MMHG | BODY MASS INDEX: 35.84 KG/M2 | OXYGEN SATURATION: 95 % | RESPIRATION RATE: 18 BRPM | DIASTOLIC BLOOD PRESSURE: 78 MMHG
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
## 2022-06-27 DIAGNOSIS — M54.9 SEVERE BACK PAIN: ICD-10-CM

## 2022-06-27 PROCEDURE — 96372 THER/PROPH/DIAG INJ SC/IM: CPT | Performed by: NURSE PRACTITIONER

## 2022-06-27 PROCEDURE — 99214 OFFICE O/P EST MOD 30 MIN: CPT | Performed by: NURSE PRACTITIONER

## 2022-06-27 RX ORDER — CYCLOBENZAPRINE HCL 10 MG
10 TABLET ORAL 3 TIMES DAILY PRN
Qty: 45 TABLET | Refills: 1 | Status: SHIPPED | OUTPATIENT
Start: 2022-06-27 | End: 2022-08-08 | Stop reason: SDUPTHER

## 2022-06-27 RX ORDER — KETOROLAC TROMETHAMINE 30 MG/ML
30 INJECTION, SOLUTION INTRAMUSCULAR; INTRAVENOUS ONCE
Status: COMPLETED | OUTPATIENT
Start: 2022-06-27 | End: 2022-06-27

## 2022-06-27 RX ORDER — HYDROCODONE BITARTRATE AND ACETAMINOPHEN 7.5; 325 MG/1; MG/1
1 TABLET ORAL EVERY 4 HOURS PRN
Qty: 18 TABLET | Refills: 0 | Status: SHIPPED | OUTPATIENT
Start: 2022-06-27 | End: 2022-07-01 | Stop reason: SDUPTHER

## 2022-06-27 RX ADMIN — KETOROLAC TROMETHAMINE 30 MG: 30 INJECTION, SOLUTION INTRAMUSCULAR; INTRAVENOUS at 15:05
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
## 2022-06-27 NOTE — PROGRESS NOTES
Injection  Injection performed in [facility] by Anusha Meza RN. Patient tolerated the procedure well without complications.  06/27/22   Anusha Meza RN    NDC: 10951-644-45  LOT: [id number]  EXP: 08/2023

## 2022-07-01 ENCOUNTER — OFFICE VISIT (OUTPATIENT)
Dept: INTERNAL MEDICINE | Facility: CLINIC | Age: 47
End: 2022-07-01

## 2022-07-01 VITALS
TEMPERATURE: 98.2 F | BODY MASS INDEX: 35.01 KG/M2 | SYSTOLIC BLOOD PRESSURE: 126 MMHG | HEART RATE: 84 BPM | HEIGHT: 71 IN | WEIGHT: 250.1 LBS | OXYGEN SATURATION: 98 % | DIASTOLIC BLOOD PRESSURE: 88 MMHG | RESPIRATION RATE: 17 BRPM

## 2022-07-01 DIAGNOSIS — M54.9 SEVERE BACK PAIN: ICD-10-CM

## 2022-07-01 DIAGNOSIS — E11.65 TYPE 2 DIABETES MELLITUS WITH HYPERGLYCEMIA, WITHOUT LONG-TERM CURRENT USE OF INSULIN: Primary | ICD-10-CM
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
## 2022-07-01 PROCEDURE — 99213 OFFICE O/P EST LOW 20 MIN: CPT

## 2022-07-01 RX ORDER — BLOOD SUGAR DIAGNOSTIC
1 STRIP MISCELLANEOUS DAILY PRN
Qty: 1 EACH | Refills: 0 | Status: SHIPPED | OUTPATIENT
Start: 2022-07-01 | End: 2022-09-30 | Stop reason: SDUPTHER

## 2022-07-01 RX ORDER — HYDROCODONE BITARTRATE AND ACETAMINOPHEN 7.5; 325 MG/1; MG/1
1 TABLET ORAL EVERY 4 HOURS PRN
Qty: 18 TABLET | Refills: 0 | Status: SHIPPED | OUTPATIENT
Start: 2022-07-01 | End: 2022-09-30
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
## 2022-07-02 LAB — HBA1C MFR BLD: 7.7 % (ref 4.8–5.6)

## 2022-07-04 DIAGNOSIS — E11.65 TYPE 2 DIABETES MELLITUS WITH HYPERGLYCEMIA, WITHOUT LONG-TERM CURRENT USE OF INSULIN: Primary | ICD-10-CM

## 2022-07-04 NOTE — PROGRESS NOTES
A1c has increased slightly from 7.5 to 7.7 I would like to increase his metformin from 500mg twice a day to 1,000mg bid.

## 2022-07-16 ENCOUNTER — APPOINTMENT (OUTPATIENT)
Dept: CT IMAGING | Facility: HOSPITAL | Age: 47
End: 2022-07-16

## 2022-07-16 ENCOUNTER — HOSPITAL ENCOUNTER (EMERGENCY)
Facility: HOSPITAL | Age: 47
Discharge: HOME | End: 2022-07-16
Attending: EMERGENCY MEDICINE | Admitting: EMERGENCY MEDICINE

## 2022-07-16 VITALS
SYSTOLIC BLOOD PRESSURE: 120 MMHG | DIASTOLIC BLOOD PRESSURE: 96 MMHG | HEART RATE: 84 BPM | BODY MASS INDEX: 35 KG/M2 | WEIGHT: 250 LBS | RESPIRATION RATE: 21 BRPM | TEMPERATURE: 98 F | HEIGHT: 71 IN | OXYGEN SATURATION: 95 %

## 2022-07-16 DIAGNOSIS — S22.22XA CLOSED FRACTURE OF BODY OF STERNUM, INITIAL ENCOUNTER: ICD-10-CM

## 2022-07-16 DIAGNOSIS — S22.41XA: Primary | ICD-10-CM

## 2022-07-16 PROCEDURE — 71250 CT THORAX DX C-: CPT

## 2022-07-16 PROCEDURE — 99283 EMERGENCY DEPT VISIT LOW MDM: CPT

## 2022-07-16 RX ORDER — HYDROCODONE BITARTRATE AND ACETAMINOPHEN 7.5; 325 MG/1; MG/1
1 TABLET ORAL EVERY 4 HOURS PRN
Qty: 15 TABLET | Refills: 0 | Status: SHIPPED | OUTPATIENT
Start: 2022-07-16 | End: 2022-09-30
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
## 2022-07-22 ENCOUNTER — TELEPHONE (OUTPATIENT)
Dept: INTERNAL MEDICINE | Facility: CLINIC | Age: 47
End: 2022-07-22

## 2022-07-22 ENCOUNTER — OFFICE VISIT (OUTPATIENT)
Dept: INTERNAL MEDICINE | Facility: CLINIC | Age: 47
End: 2022-07-22

## 2022-07-22 VITALS
HEART RATE: 87 BPM | BODY MASS INDEX: 35.42 KG/M2 | SYSTOLIC BLOOD PRESSURE: 126 MMHG | DIASTOLIC BLOOD PRESSURE: 84 MMHG | OXYGEN SATURATION: 98 % | TEMPERATURE: 97.8 F | RESPIRATION RATE: 20 BRPM | HEIGHT: 71 IN | WEIGHT: 253 LBS

## 2022-07-22 DIAGNOSIS — R19.00 ABDOMINAL WALL BULGE: Primary | ICD-10-CM

## 2022-07-22 DIAGNOSIS — R07.81 RIB PAIN: ICD-10-CM

## 2022-07-22 PROCEDURE — 99213 OFFICE O/P EST LOW 20 MIN: CPT

## 2022-07-22 NOTE — PROGRESS NOTES
"        Subjective     Chief Complaint   Patient presents with   • Hospital Follow Up Visit     Pt has fracture of 8 ribs and sternum        History of Present Illness  Patient presents today post ER visit. Went to ER 7/16/2022, reports did Ct chest and was found to have:  1.  Subacute healing fractures of the RIGHT third through tenth ribs.  Fracture line in the RIGHT fifth rib has an acute appearing margin and  could be acute in nature, though not definitive.      2.  Subacute nondisplaced nondepressed sternal body fracture.     3.  No pneumothorax, hemothorax, or pulmonary contusion.     4.  Nonobstructing bilateral renal calculi.    Patient states that he went to ER because the pain was just becoming more and more severe. States it starting to feel better but the pain in his \"chest bone\" is still very sore.     States 2 days ago he noticed a \"bugling\" in the right side of his upper abdomen. Denies any pain to this area. Reports he was laying on a creeper and grabbed a hold of something with his right hand to role him and after that he noticed the bulge. Denies any shortness of breath. Last bowel movement was this morning.     Patient's PMR from outside medical facility reviewed and noted.    Review of Systems   Constitutional: Negative for activity change, fatigue and unexpected weight change.   HENT: Negative for mouth sores and trouble swallowing.    Eyes: Negative for discharge and visual disturbance.   Respiratory: Negative for cough and shortness of breath.    Cardiovascular: Negative for chest pain and leg swelling.   Gastrointestinal: Negative for abdominal pain, constipation, diarrhea and nausea.        \"bulging on my right side\"   Genitourinary: Negative for decreased urine volume, difficulty urinating and hematuria.   Musculoskeletal: Negative for back pain and gait problem.        Middle of my chest still hurts and bottom right rib.    Skin: Negative for color change and rash.   Allergic/Immunologic: " Negative for environmental allergies and immunocompromised state.   Neurological: Negative for weakness and headaches.   Psychiatric/Behavioral: Negative for confusion and sleep disturbance.        Otherwise complete ROS reviewed and negative except as mentioned in the HPI.    Past Medical History:   Past Medical History:   Diagnosis Date   • Hypertension    • Skin cancer      Past Surgical History:History reviewed. No pertinent surgical history.  Social History:  reports that he quit smoking about 22 years ago. His smoking use included cigarettes. He has a 9.00 pack-year smoking history. He quit smokeless tobacco use about 17 years ago. He reports current drug use. Drug: Marijuana. He reports that he does not drink alcohol.    Family History: family history includes Diabetes in his mother; Hypertension in his mother.      Allergies:  Allergies   Allergen Reactions   • Lisinopril Other (See Comments)     hypotension     Medications:  Prior to Admission medications    Medication Sig Start Date End Date Taking? Authorizing Provider   amLODIPine (NORVASC) 5 MG tablet Take 1 tablet by mouth Daily. 4/1/22  Yes Kiara Rouse APRN   Blood Glucose Monitoring Suppl ([facility] Blood Glucose Monitor) device 1 each Daily As Needed (glucose monitoring). 7/1/22  Yes Kiara Rouse APRN   cyclobenzaprine (FLEXERIL) 10 MG tablet Take 1 tablet by mouth 3 (Three) Times a Day As Needed for Muscle Spasms. 6/27/22  Yes Gunjan Posey APRN   fluticasone (FLONASE) 50 MCG/ACT nasal spray fluticasone propionate 50 mcg/actuation nasal spray,suspension   Yes Provider, MD Pavel   glucose blood test strip Use as instructed 7/1/22  Yes Kiara Rouse APRN   metFORMIN (Glucophage) 500 MG tablet Take 2 tablets by mouth 2 (Two) Times a Day With Meals. 7/4/22  Yes Kiara Rouse APRN   valsartan-hydrochlorothiazide (DIOVAN-HCT) 80-12.5 MG per tablet Take 1 tablet by mouth Daily. 4/1/22  Yes Kiara Rouse APRN  "  HYDROcodone-acetaminophen (Norco) 7.5-325 MG per tablet Take 1 tablet by mouth Every 4 (Four) Hours As Needed for Severe Pain . 7/1/22   Gunjan Posey APRN   HYDROcodone-acetaminophen (NORCO) 7.5-325 MG per tablet Take 1 tablet by mouth Every 4 (Four) Hours As Needed for Moderate Pain . 7/16/22   Brandon Ngo Jr., MD         Objective     Vital Signs: /84 (BP Location: Left arm, Patient Position: Sitting, Cuff Size: Adult)   Pulse 87   Temp 97.8 °F (36.6 °C) (Infrared)   Resp 20   Ht 180.3 cm (71\")   Wt 115 kg (253 lb)   SpO2 98%   BMI 35.29 kg/m²   Physical Exam  Constitutional:       General: He is not in acute distress.     Appearance: Normal appearance. He is not ill-appearing.   HENT:      Head: Normocephalic and atraumatic.      Right Ear: External ear normal.      Left Ear: External ear normal.      Nose: Nose normal.      Mouth/Throat:      Mouth: Mucous membranes are moist.      Pharynx: No posterior oropharyngeal erythema.   Eyes:      General: No scleral icterus.     Extraocular Movements: Extraocular movements intact.      Conjunctiva/sclera: Conjunctivae normal.      Pupils: Pupils are equal, round, and reactive to light.   Cardiovascular:      Rate and Rhythm: Normal rate and regular rhythm.      Pulses: Normal pulses.      Heart sounds: Normal heart sounds.   Pulmonary:      Effort: Pulmonary effort is normal. No respiratory distress.      Breath sounds: Normal breath sounds. No wheezing.   Abdominal:      General: Bowel sounds are normal.      Palpations: Abdomen is soft.      Tenderness: There is no abdominal tenderness. There is no right CVA tenderness or left CVA tenderness.      Comments: Slight bulge noted to right upper quadrant of abdomen.  Nontender on palpation.   Musculoskeletal:         General: Normal range of motion.      Cervical back: Normal range of motion.      Right lower leg: No edema.      Left lower leg: No edema.   Skin:     General: Skin is warm " and dry.      Findings: No erythema or rash.   Neurological:      General: No focal deficit present.      Mental Status: He is alert and oriented to person, place, and time. Mental status is at baseline.      Motor: No weakness.   Psychiatric:         Mood and Affect: Mood normal.         Behavior: Behavior normal.         Thought Content: Thought content normal.         Judgment: Judgment normal.                  Results Reviewed:  Glucose   Date Value Ref Range Status   04/01/2022 174 (H) 65 - 99 mg/dL Final   02/11/2019 139 (H) 74 - 109 mg/dL Final     BUN   Date Value Ref Range Status   04/01/2022 12 6 - 24 mg/dL Final   02/11/2019 15 6 - 20 mg/dL Final     Creatinine   Date Value Ref Range Status   04/01/2022 0.85 0.76 - 1.27 mg/dL Final   02/11/2019 0.8 0.5 - 1.2 mg/dL Final     Sodium   Date Value Ref Range Status   04/01/2022 136 134 - 144 mmol/L Final   02/11/2019 139 136 - 145 mmol/L Final     Potassium   Date Value Ref Range Status   04/01/2022 4.4 3.5 - 5.2 mmol/L Final   02/11/2019 4.1 3.5 - 5.0 mmol/L Final     Chloride   Date Value Ref Range Status   04/01/2022 100 96 - 106 mmol/L Final   02/11/2019 101 98 - 111 mmol/L Final     CO2   Date Value Ref Range Status   02/11/2019 27 22 - 29 mmol/L Final     Total CO2   Date Value Ref Range Status   04/01/2022 22 20 - 29 mmol/L Final     Calcium   Date Value Ref Range Status   04/01/2022 9.4 8.7 - 10.2 mg/dL Final   02/11/2019 8.7 8.6 - 10.0 mg/dL Final     ALT (SGPT)   Date Value Ref Range Status   04/01/2022 45 (H) 0 - 44 IU/L Final     AST (SGOT)   Date Value Ref Range Status   04/01/2022 33 0 - 40 IU/L Final     WBC   Date Value Ref Range Status   04/01/2022 6.7 3.4 - 10.8 x10E3/uL Final   02/11/2019 7.0 4.8 - 10.8 K/uL Final     Hematocrit   Date Value Ref Range Status   04/01/2022 46.3 37.5 - 51.0 % Final   02/11/2019 44.6 42.0 - 52.0 % Final     Platelets   Date Value Ref Range Status   04/01/2022 305 150 - 450 x10E3/uL Final   02/11/2019 300 130 -  400 K/uL Final     Triglycerides   Date Value Ref Range Status   04/01/2022 152 (H) 0 - 149 mg/dL Final     HDL Cholesterol   Date Value Ref Range Status   04/01/2022 30 (L) >39 mg/dL Final     LDL Chol Calc (NIH)   Date Value Ref Range Status   04/01/2022 124 (H) 0 - 99 mg/dL Final     LDL/HDL Ratio   Date Value Ref Range Status   09/28/2014 5.6 (H) 0.0 - 3.4 Final     Hemoglobin A1C   Date Value Ref Range Status   07/01/2022 7.7 (H) 4.8 - 5.6 % Final     Comment:              Prediabetes: 5.7 - 6.4           Diabetes: >6.4           Glycemic control for adults with diabetes: <7.0           Assessment / Plan     Assessment/Plan:  1. Abdominal wall bulge  - XR Abdomen KUB (In Office)        Return if symptoms worsen or fail to improve. unless patient needs to be seen sooner or acute issues arise.      I have discussed the patient results/orders and and plan/recommendation with them at today's visit.      Kiara Rouse, APRN   07/22/2022

## 2022-07-22 NOTE — TELEPHONE ENCOUNTER
Called patient discussed results of KUB.  Advised no abnormalities noted.  I did advise that if bulge did not decrease within 1 week I would like patient to call office and I will order CT abdomen pelvis.  But however due to no pain on palpation, and only slight bulge noted advised this could be inflammation related to rib fractures and that right side area and sternal fracture.  However I advised if symptoms worsened before the week.  For patient to call and we would order CT abdomen pelvis at that time.  Patient verbalized understanding all question concerns addressed this time.
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
## 2022-08-08 DIAGNOSIS — M54.9 SEVERE BACK PAIN: ICD-10-CM

## 2022-08-08 RX ORDER — CYCLOBENZAPRINE HCL 10 MG
TABLET ORAL
Qty: 45 TABLET | Refills: 1 | Status: SHIPPED | OUTPATIENT
Start: 2022-08-08 | End: 2022-09-30

## 2022-08-08 NOTE — TELEPHONE ENCOUNTER
Rx Refill Note  Requested Prescriptions     Pending Prescriptions Disp Refills   • cyclobenzaprine (FLEXERIL) 10 MG tablet [Pharmacy Med Name: CYCLOBENZAPRINE 10 MG TABLET] 45 tablet 1     Sig: TAKE 1 TABLET BY MOUTH 3 TIMES A DAY AS NEEDED FOR MUSCLE SPASMS.      Last office visit with prescribing clinician: 7/22/22      Next office visit with prescribing clinician: 9/30/22           Anusha Meza RN  08/08/22, 13:35 CDT

## 2022-08-09 DIAGNOSIS — R07.81 RIB PAIN: ICD-10-CM

## 2022-08-09 NOTE — TELEPHONE ENCOUNTER
Rx Refill Note  Requested Prescriptions     Pending Prescriptions Disp Refills   • diclofenac (VOLTAREN) 50 MG EC tablet 30 tablet 0     Sig: Take 1 tablet by mouth 2 (Two) Times a Day As Needed (pain).      Last office visit with prescribing clinician: 7/22/2022      Next office visit with prescribing clinician: 9/30/2022            Dian Bentley MA  08/09/22, 14:41 CDT

## 2022-09-27 DIAGNOSIS — I10 PRIMARY HYPERTENSION: ICD-10-CM

## 2022-09-27 RX ORDER — AMLODIPINE BESYLATE 5 MG/1
5 TABLET ORAL DAILY
Qty: 90 TABLET | Refills: 1 | Status: SHIPPED | OUTPATIENT
Start: 2022-09-27 | End: 2022-09-30 | Stop reason: SDUPTHER

## 2022-09-27 NOTE — TELEPHONE ENCOUNTER
Rx Refill Note  Requested Prescriptions     Pending Prescriptions Disp Refills   • amLODIPine (NORVASC) 5 MG tablet 90 tablet 1     Sig: Take 1 tablet by mouth Daily.      Last office visit with prescribing clinician: 7/22/2022      Next office visit with prescribing clinician: 9/30/2022            Dian Bentley MA  09/27/22, 10:40 CDT

## 2022-09-30 ENCOUNTER — OFFICE VISIT (OUTPATIENT)
Dept: INTERNAL MEDICINE | Facility: CLINIC | Age: 47
End: 2022-09-30

## 2022-09-30 VITALS
BODY MASS INDEX: 33.91 KG/M2 | TEMPERATURE: 98.2 F | OXYGEN SATURATION: 98 % | HEIGHT: 71 IN | SYSTOLIC BLOOD PRESSURE: 124 MMHG | DIASTOLIC BLOOD PRESSURE: 82 MMHG | HEART RATE: 96 BPM | RESPIRATION RATE: 17 BRPM | WEIGHT: 242.2 LBS

## 2022-09-30 DIAGNOSIS — I10 PRIMARY HYPERTENSION: ICD-10-CM

## 2022-09-30 DIAGNOSIS — E11.65 TYPE 2 DIABETES MELLITUS WITH HYPERGLYCEMIA, WITHOUT LONG-TERM CURRENT USE OF INSULIN: ICD-10-CM

## 2022-09-30 DIAGNOSIS — J30.2 SEASONAL ALLERGIES: Primary | ICD-10-CM

## 2022-09-30 LAB
EXPIRATION DATE: ABNORMAL
HBA1C MFR BLD: 6.8 %
Lab: ABNORMAL

## 2022-09-30 PROCEDURE — 99214 OFFICE O/P EST MOD 30 MIN: CPT

## 2022-09-30 PROCEDURE — 83036 HEMOGLOBIN GLYCOSYLATED A1C: CPT

## 2022-09-30 PROCEDURE — 3044F HG A1C LEVEL LT 7.0%: CPT

## 2022-09-30 RX ORDER — BLOOD SUGAR DIAGNOSTIC
1 STRIP MISCELLANEOUS DAILY PRN
Qty: 1 EACH | Refills: 0 | Status: SHIPPED | OUTPATIENT
Start: 2022-09-30

## 2022-09-30 RX ORDER — AMLODIPINE BESYLATE 5 MG/1
5 TABLET ORAL DAILY
Qty: 90 TABLET | Refills: 1 | Status: SHIPPED | OUTPATIENT
Start: 2022-09-30 | End: 2022-12-30 | Stop reason: SDUPTHER

## 2022-09-30 RX ORDER — FLUTICASONE PROPIONATE 50 MCG
1 SPRAY, SUSPENSION (ML) NASAL DAILY
Qty: 11.1 ML | Refills: 1 | Status: SHIPPED | OUTPATIENT
Start: 2022-09-30 | End: 2023-03-03 | Stop reason: SDUPTHER

## 2022-09-30 NOTE — PROGRESS NOTES
"        Subjective     Chief Complaint   Patient presents with   • Diabetes     Follow up.        History of Present Illness  Patient presents today with diabetes follow up. States that he has not been checking his sugars at home, because has been unable to get his machine  yet from pharmacy, states they gave him the strips but not the \".\" Last A1c was 7.7. states he has been taking 500 mg metformin twice a day instead of the prescribed 1000 mg twice a day. States he was unaware that he was supposed to be taking 2 pills twice a day. Discussed prescribing 1000 mg pills so he would only have to take 2 pills a day, he states that this would likely increase  His compliance. Stable on current BP medication, needing refill on Norvasc.   Has not seen the eye doctor.     Patient's PMR from outside medical facility reviewed and noted.    Review of Systems   Constitutional: Negative for activity change, fatigue and unexpected weight change.   HENT: Negative for congestion, mouth sores and trouble swallowing.    Eyes: Negative for discharge and visual disturbance.   Respiratory: Negative for cough and shortness of breath.    Cardiovascular: Negative for chest pain and leg swelling.   Gastrointestinal: Negative for abdominal pain, constipation, diarrhea and nausea.   Genitourinary: Negative for decreased urine volume, difficulty urinating and hematuria.   Musculoskeletal: Negative for back pain and gait problem.   Skin: Negative for color change and rash.   Allergic/Immunologic: Negative for environmental allergies and immunocompromised state.   Neurological: Negative for weakness and headaches.   Psychiatric/Behavioral: Negative for confusion and sleep disturbance.        Otherwise complete ROS reviewed and negative except as mentioned in the HPI.    Past Medical History:   Past Medical History:   Diagnosis Date   • Hypertension    • Skin cancer      Past Surgical History:History reviewed. No pertinent surgical " history.  Social History:  reports that he quit smoking about 22 years ago. His smoking use included cigarettes. He has a 9.00 pack-year smoking history. He quit smokeless tobacco use about 17 years ago. He reports current drug use. Drug: Marijuana. He reports that he does not drink alcohol.    Family History: family history includes Diabetes in his mother; Hypertension in his mother.      Allergies:  Allergies   Allergen Reactions   • Lisinopril Other (See Comments)     hypotension     Medications:  Prior to Admission medications    Medication Sig Start Date End Date Taking? Authorizing Provider   amLODIPine (NORVASC) 5 MG tablet Take 1 tablet by mouth Daily. 9/27/22  Yes Kiara Rouse APRN   Blood Glucose Monitoring Suppl ([facility] Blood Glucose Monitor) device 1 each Daily As Needed (glucose monitoring). 7/1/22  Yes Kiara Rouse APRN   cyclobenzaprine (FLEXERIL) 10 MG tablet TAKE 1 TABLET BY MOUTH 3 TIMES A DAY AS NEEDED FOR MUSCLE SPASMS. 8/8/22  Yes Kiara Rouse APRN   diclofenac (VOLTAREN) 50 MG EC tablet Take 1 tablet by mouth 2 (Two) Times a Day As Needed (pain). 8/9/22  Yes Kiara Rouse APRN   fluticasone (FLONASE) 50 MCG/ACT nasal spray fluticasone propionate 50 mcg/actuation nasal spray,suspension   Yes Provider, MD Pavel   glucose blood test strip Use as instructed 7/1/22  Yes Kiara Rouse APRN   HYDROcodone-acetaminophen (Norco) 7.5-325 MG per tablet Take 1 tablet by mouth Every 4 (Four) Hours As Needed for Severe Pain . 7/1/22  Yes Gunjan Posey APRN   HYDROcodone-acetaminophen (NORCO) 7.5-325 MG per tablet Take 1 tablet by mouth Every 4 (Four) Hours As Needed for Moderate Pain . 7/16/22  Yes Brandon Ngo Jr., MD   metFORMIN (Glucophage) 500 MG tablet Take 2 tablets by mouth 2 (Two) Times a Day With Meals. 7/4/22  Yes Kiara Rouse APRN   valsartan-hydrochlorothiazide (DIOVAN-HCT) 80-12.5 MG per tablet Take 1 tablet by mouth Daily. 4/1/22  Yes Nasim  "KWAME Craig         Objective     Vital Signs: /82 (BP Location: Left arm, Patient Position: Sitting, Cuff Size: Adult)   Pulse 96   Temp 98.2 °F (36.8 °C) (Skin)   Resp 17   Ht 180.3 cm (71\")   Wt 110 kg (242 lb 3.2 oz)   SpO2 98%   BMI 33.78 kg/m²   Physical Exam  Constitutional:       General: He is not in acute distress.     Appearance: Normal appearance. He is normal weight. He is not ill-appearing.   HENT:      Head: Normocephalic and atraumatic.      Nose: Nose normal.      Mouth/Throat:      Mouth: Mucous membranes are moist.      Pharynx: No posterior oropharyngeal erythema.   Eyes:      General: No scleral icterus.     Extraocular Movements: Extraocular movements intact.      Conjunctiva/sclera: Conjunctivae normal.      Pupils: Pupils are equal, round, and reactive to light.   Cardiovascular:      Rate and Rhythm: Normal rate and regular rhythm.      Pulses: Normal pulses.      Heart sounds: Normal heart sounds.   Pulmonary:      Effort: Pulmonary effort is normal. No respiratory distress.      Breath sounds: Normal breath sounds. No wheezing.   Abdominal:      General: Abdomen is flat. Bowel sounds are normal.      Palpations: Abdomen is soft.      Tenderness: There is no abdominal tenderness.   Musculoskeletal:         General: Normal range of motion.      Cervical back: Normal range of motion.      Right lower leg: No edema.      Left lower leg: No edema.   Feet:      Right foot:      Protective Sensation: 10 sites tested. 10 sites sensed.      Skin integrity: Dry skin present.      Toenail Condition: Right toenails are abnormally thick.      Left foot:      Protective Sensation: 10 sites tested. 10 sites sensed.      Skin integrity: Dry skin present.      Toenail Condition: Left toenails are abnormally thick.   Skin:     General: Skin is warm and dry.      Findings: No erythema or rash.   Neurological:      General: No focal deficit present.      Mental Status: He is alert and oriented " to person, place, and time. Mental status is at baseline.      Motor: No weakness.   Psychiatric:         Mood and Affect: Mood normal.         Behavior: Behavior normal.         Thought Content: Thought content normal.         Judgment: Judgment normal.         BMI is >= 30 and <35. (Class 1 Obesity). The following options were offered after discussion;: exercise counseling/recommendations and nutrition counseling/recommendations        Results Reviewed:  Glucose   Date Value Ref Range Status   04/01/2022 174 (H) 65 - 99 mg/dL Final   02/11/2019 139 (H) 74 - 109 mg/dL Final     BUN   Date Value Ref Range Status   04/01/2022 12 6 - 24 mg/dL Final   02/11/2019 15 6 - 20 mg/dL Final     Creatinine   Date Value Ref Range Status   04/01/2022 0.85 0.76 - 1.27 mg/dL Final   02/11/2019 0.8 0.5 - 1.2 mg/dL Final     Sodium   Date Value Ref Range Status   04/01/2022 136 134 - 144 mmol/L Final   02/11/2019 139 136 - 145 mmol/L Final     Potassium   Date Value Ref Range Status   04/01/2022 4.4 3.5 - 5.2 mmol/L Final   02/11/2019 4.1 3.5 - 5.0 mmol/L Final     Chloride   Date Value Ref Range Status   04/01/2022 100 96 - 106 mmol/L Final   02/11/2019 101 98 - 111 mmol/L Final     CO2   Date Value Ref Range Status   02/11/2019 27 22 - 29 mmol/L Final     Total CO2   Date Value Ref Range Status   04/01/2022 22 20 - 29 mmol/L Final     Calcium   Date Value Ref Range Status   04/01/2022 9.4 8.7 - 10.2 mg/dL Final   02/11/2019 8.7 8.6 - 10.0 mg/dL Final     ALT (SGPT)   Date Value Ref Range Status   04/01/2022 45 (H) 0 - 44 IU/L Final     AST (SGOT)   Date Value Ref Range Status   04/01/2022 33 0 - 40 IU/L Final     WBC   Date Value Ref Range Status   04/01/2022 6.7 3.4 - 10.8 x10E3/uL Final   02/11/2019 7.0 4.8 - 10.8 K/uL Final     Hematocrit   Date Value Ref Range Status   04/01/2022 46.3 37.5 - 51.0 % Final   02/11/2019 44.6 42.0 - 52.0 % Final     Platelets   Date Value Ref Range Status   04/01/2022 305 150 - 450 x10E3/uL Final    02/11/2019 300 130 - 400 K/uL Final     Triglycerides   Date Value Ref Range Status   04/01/2022 152 (H) 0 - 149 mg/dL Final     HDL Cholesterol   Date Value Ref Range Status   04/01/2022 30 (L) >39 mg/dL Final     LDL Chol Calc (NIH)   Date Value Ref Range Status   04/01/2022 124 (H) 0 - 99 mg/dL Final     LDL/HDL Ratio   Date Value Ref Range Status   09/28/2014 5.6 (H) 0.0 - 3.4 Final     Hemoglobin A1C   Date Value Ref Range Status   07/01/2022 7.7 (H) 4.8 - 5.6 % Final     Comment:              Prediabetes: 5.7 - 6.4           Diabetes: >6.4           Glycemic control for adults with diabetes: <7.0           Assessment / Plan     Assessment/Plan:  1. Primary hypertension  - amLODIPine (NORVASC) 5 MG tablet; Take 1 tablet by mouth Daily.  Dispense: 90 tablet; Refill: 1  - CBC w AUTO Differential  - Comprehensive metabolic panel    2. Type 2 diabetes mellitus with hyperglycemia, without long-term current use of insulin ([facility])  - POC Glycosylated Hemoglobin (Hb A1C)  - MicroAlbumin, Urine, Random - Urine, Clean Catch  - Blood Glucose Monitoring Suppl ([facility] Blood Glucose Monitor) device; 1 each Daily As Needed (glucose monitoring).  Dispense: 1 each; Refill: 0  - metFORMIN (Glucophage) 1000 MG tablet; Take 1 tablet by mouth 2 (Two) Times a Day With Meals.  Dispense: 90 tablet; Refill: 1    3. Seasonal allergies  - fluticasone (FLONASE) 50 MCG/ACT nasal spray; 1 spray into the nostril(s) as directed by provider Daily.  Dispense: 11.1 mL; Refill: 1    46 y.o. male for follow up of diabetes. Diabetic Review of Systems - medication compliance: compliant most of the time, diabetic diet compliance: compliant most of the time, home glucose monitoring: is not performed.      Exam: heart sounds normal rate, regular rhythm, normal S1, S2, no murmurs, rubs, clicks or gallops, chest clear, no carotid bruits, feet: warm, good capillary refill    ASSESSMENT:  Diabetes Mellitus: needs further observation, needs improvement  and needs to follow diet more regularly    PLAN:  See orders for this visit as documented in the electronic medical record.  Issues reviewed with him: diabetic diet discussed in detail, written exchange diet given, low cholesterol diet, weight control and daily exercise discussed, home glucose monitoring emphasized, glucose meter dispensed to patient, all medications, side effects and compliance discussed carefully, foot care discussed and Podiatry visits discussed and annual eye examinations at Ophthalmology discussed.      Return in about 3 months (around 12/30/2022) for Recheck. unless patient needs to be seen sooner or acute issues arise.      I have discussed the patient results/orders and and plan/recommendation with them at today's visit.      Kiara Rouse, APRN   09/30/2022

## 2022-10-01 LAB
ALBUMIN SERPL-MCNC: 4.4 G/DL (ref 4–5)
ALBUMIN/GLOB SERPL: 1.5 {RATIO} (ref 1.2–2.2)
ALP SERPL-CCNC: 113 IU/L (ref 44–121)
ALT SERPL-CCNC: 42 IU/L (ref 0–44)
AST SERPL-CCNC: 31 IU/L (ref 0–40)
BASOPHILS # BLD AUTO: 0.1 X10E3/UL (ref 0–0.2)
BASOPHILS NFR BLD AUTO: 1 %
BILIRUB SERPL-MCNC: 0.5 MG/DL (ref 0–1.2)
BUN SERPL-MCNC: 19 MG/DL (ref 6–24)
BUN/CREAT SERPL: 20 (ref 9–20)
CALCIUM SERPL-MCNC: 9.3 MG/DL (ref 8.7–10.2)
CHLORIDE SERPL-SCNC: 101 MMOL/L (ref 96–106)
CO2 SERPL-SCNC: 23 MMOL/L (ref 20–29)
CREAT SERPL-MCNC: 0.95 MG/DL (ref 0.76–1.27)
EGFRCR SERPLBLD CKD-EPI 2021: 99 ML/MIN/1.73
EOSINOPHIL # BLD AUTO: 0.1 X10E3/UL (ref 0–0.4)
EOSINOPHIL NFR BLD AUTO: 2 %
ERYTHROCYTE [DISTWIDTH] IN BLOOD BY AUTOMATED COUNT: 13.4 % (ref 11.6–15.4)
GLOBULIN SER CALC-MCNC: 3 G/DL (ref 1.5–4.5)
GLUCOSE SERPL-MCNC: 182 MG/DL (ref 70–99)
HCT VFR BLD AUTO: 44.3 % (ref 37.5–51)
HGB BLD-MCNC: 14.8 G/DL (ref 13–17.7)
IMM GRANULOCYTES # BLD AUTO: 0 X10E3/UL (ref 0–0.1)
IMM GRANULOCYTES NFR BLD AUTO: 0 %
LYMPHOCYTES # BLD AUTO: 1.4 X10E3/UL (ref 0.7–3.1)
LYMPHOCYTES NFR BLD AUTO: 19 %
MCH RBC QN AUTO: 29.2 PG (ref 26.6–33)
MCHC RBC AUTO-ENTMCNC: 33.4 G/DL (ref 31.5–35.7)
MCV RBC AUTO: 87 FL (ref 79–97)
MICROALBUMIN UR-MCNC: 26.3 UG/ML
MONOCYTES # BLD AUTO: 0.5 X10E3/UL (ref 0.1–0.9)
MONOCYTES NFR BLD AUTO: 7 %
NEUTROPHILS # BLD AUTO: 5.3 X10E3/UL (ref 1.4–7)
NEUTROPHILS NFR BLD AUTO: 71 %
PLATELET # BLD AUTO: 349 X10E3/UL (ref 150–450)
POTASSIUM SERPL-SCNC: 4.4 MMOL/L (ref 3.5–5.2)
PROT SERPL-MCNC: 7.4 G/DL (ref 6–8.5)
RBC # BLD AUTO: 5.07 X10E6/UL (ref 4.14–5.8)
SODIUM SERPL-SCNC: 140 MMOL/L (ref 134–144)
WBC # BLD AUTO: 7.5 X10E3/UL (ref 3.4–10.8)

## 2022-10-27 ENCOUNTER — OFFICE VISIT (OUTPATIENT)
Dept: INTERNAL MEDICINE | Facility: CLINIC | Age: 47
End: 2022-10-27

## 2022-10-27 VITALS
TEMPERATURE: 98.7 F | BODY MASS INDEX: 33.91 KG/M2 | RESPIRATION RATE: 18 BRPM | WEIGHT: 242.2 LBS | OXYGEN SATURATION: 98 % | HEART RATE: 89 BPM | HEIGHT: 71 IN

## 2022-10-27 DIAGNOSIS — R05.9 COUGH, UNSPECIFIED TYPE: Primary | ICD-10-CM

## 2022-10-27 DIAGNOSIS — J06.9 UPPER RESPIRATORY TRACT INFECTION, UNSPECIFIED TYPE: ICD-10-CM

## 2022-10-27 DIAGNOSIS — R52 GENERALIZED BODY ACHES: ICD-10-CM

## 2022-10-27 LAB
EXPIRATION DATE: NORMAL
FLUAV AG UPPER RESP QL IA.RAPID: NOT DETECTED
FLUBV AG UPPER RESP QL IA.RAPID: NOT DETECTED
INTERNAL CONTROL: NORMAL
Lab: NORMAL
SARS-COV-2 AG UPPER RESP QL IA.RAPID: NOT DETECTED

## 2022-10-27 PROCEDURE — 87428 SARSCOV & INF VIR A&B AG IA: CPT

## 2022-10-27 PROCEDURE — 99213 OFFICE O/P EST LOW 20 MIN: CPT

## 2022-10-27 RX ORDER — AMOXICILLIN AND CLAVULANATE POTASSIUM 875; 125 MG/1; MG/1
1 TABLET, FILM COATED ORAL 2 TIMES DAILY
Qty: 14 TABLET | Refills: 0 | Status: SHIPPED | OUTPATIENT
Start: 2022-10-27 | End: 2022-12-30

## 2022-10-27 RX ORDER — BLOOD-GLUCOSE METER
KIT MISCELLANEOUS
COMMUNITY
Start: 2022-09-30

## 2022-10-27 RX ORDER — METHYLPREDNISOLONE 4 MG/1
TABLET ORAL
Qty: 21 TABLET | Refills: 0 | Status: SHIPPED | OUTPATIENT
Start: 2022-10-27 | End: 2022-12-30

## 2022-10-27 NOTE — PROGRESS NOTES
Subjective     Chief Complaint   Patient presents with   • Cough     Symptoms started 4 days ago.    • Nasal Congestion       History of Present Illness  Patient presents today with complaints of nasal congestion and cough that is worse in the mornings. Has had some shortness of breath as well. No fevers. Cough is productive, states it is phlegm. Is experiencing some substantial body aches and fatigue as well. Reports throat was sore, but ears do not hurt. Reports went to [facility] this weekend and thinks he brought something home, reports his daughter has similar symptoms.   Patient's PMR from outside medical facility reviewed and noted.    Review of Systems   Constitutional: Positive for fatigue. Negative for activity change and unexpected weight change.   HENT: Positive for congestion, postnasal drip, rhinorrhea and sore throat. Negative for ear pain, mouth sores and trouble swallowing.    Eyes: Negative for discharge and visual disturbance.   Respiratory: Positive for cough. Negative for shortness of breath.    Cardiovascular: Negative for chest pain and leg swelling.   Gastrointestinal: Negative for abdominal pain, constipation, diarrhea and nausea.   Genitourinary: Negative for decreased urine volume, difficulty urinating and hematuria.   Musculoskeletal: Positive for myalgias. Negative for back pain and gait problem.   Skin: Negative for color change and rash.   Allergic/Immunologic: Negative for environmental allergies and immunocompromised state.   Neurological: Negative for weakness and headaches.   Psychiatric/Behavioral: Negative for confusion and sleep disturbance.        Otherwise complete ROS reviewed and negative except as mentioned in the HPI.    Past Medical History:   Past Medical History:   Diagnosis Date   • Hypertension    • Skin cancer      Past Surgical History:History reviewed. No pertinent surgical history.  Social History:  reports that he quit smoking about 22 years ago. His  "smoking use included cigarettes. He has a 9.00 pack-year smoking history. He quit smokeless tobacco use about 17 years ago. He reports current drug use. Drug: Marijuana. He reports that he does not drink alcohol.    Family History: family history includes Diabetes in his mother; Hypertension in his mother.      Allergies:  Allergies   Allergen Reactions   • Lisinopril Other (See Comments)     hypotension     Medications:  Prior to Admission medications    Medication Sig Start Date End Date Taking? Authorizing Provider   amLODIPine (NORVASC) 5 MG tablet Take 1 tablet by mouth Daily. 9/30/22  Yes Kiara Rouse APRN   Blood Glucose Monitoring Suppl ([facility] Blood Glucose Monitor) device 1 each Daily As Needed (glucose monitoring). 9/30/22  Yes Kiara Rouse APRN   diclofenac (VOLTAREN) 50 MG EC tablet Take 1 tablet by mouth 2 (Two) Times a Day As Needed (pain). 8/9/22  Yes Kiara Rouse APRN   fluticasone (FLONASE) 50 MCG/ACT nasal spray 1 spray into the nostril(s) as directed by provider Daily. 9/30/22  Yes Kiara Rouse APRN   glucose blood test strip Use as instructed 7/1/22  Yes Kiara Rouse APRN   metFORMIN (Glucophage) 1000 MG tablet Take 1 tablet by mouth 2 (Two) Times a Day With Meals. 9/30/22  Yes Kiara Rouse APRN   valsartan-hydrochlorothiazide (DIOVAN-HCT) 80-12.5 MG per tablet Take 1 tablet by mouth Daily. 4/1/22  Yes Kiara Rouse APRN   Blood Glucose Monitoring Suppl (FreeStyle Freedom Lite) w/Device kit TEST ONCE A DAY AS NEEDED (GLUCOSE MONITORING). 9/30/22   Provider, MD Pavel         Objective     Vital Signs: Pulse 89   Temp 98.7 °F (37.1 °C) (Skin)   Resp 18   Ht 180.3 cm (71\")   Wt 110 kg (242 lb 3.2 oz)   SpO2 98%   BMI 33.78 kg/m²   Physical Exam  Constitutional:       General: He is not in acute distress.     Appearance: Normal appearance. He is ill-appearing.   HENT:      Head: Normocephalic and atraumatic.      Right Ear: External ear normal.      " Left Ear: External ear normal.      Ears:      Comments: BL TM bulging     Nose: Nose normal.      Mouth/Throat:      Mouth: Mucous membranes are moist.      Pharynx: No posterior oropharyngeal erythema.   Eyes:      General: No scleral icterus.     Extraocular Movements: Extraocular movements intact.      Conjunctiva/sclera: Conjunctivae normal.      Pupils: Pupils are equal, round, and reactive to light.   Cardiovascular:      Rate and Rhythm: Normal rate and regular rhythm.      Pulses: Normal pulses.      Heart sounds: Normal heart sounds.   Pulmonary:      Effort: Pulmonary effort is normal. No respiratory distress.      Breath sounds: Normal breath sounds. No wheezing.   Abdominal:      General: Abdomen is flat. Bowel sounds are normal.      Palpations: Abdomen is soft.      Tenderness: There is no abdominal tenderness.   Musculoskeletal:         General: Normal range of motion.      Cervical back: Normal range of motion.      Right lower leg: No edema.      Left lower leg: No edema.   Skin:     General: Skin is warm and dry.      Findings: No erythema or rash.   Neurological:      General: No focal deficit present.      Mental Status: He is alert and oriented to person, place, and time. Mental status is at baseline.      Motor: No weakness.   Psychiatric:         Mood and Affect: Mood normal.         Behavior: Behavior normal.         Thought Content: Thought content normal.         Judgment: Judgment normal.         Results Reviewed:  Glucose   Date Value Ref Range Status   09/30/2022 182 (H) 70 - 99 mg/dL Final     Comment:                   **Please note reference interval change**   02/11/2019 139 (H) 74 - 109 mg/dL Final     BUN   Date Value Ref Range Status   09/30/2022 19 6 - 24 mg/dL Final   02/11/2019 15 6 - 20 mg/dL Final     Creatinine   Date Value Ref Range Status   09/30/2022 0.95 0.76 - 1.27 mg/dL Final   02/11/2019 0.8 0.5 - 1.2 mg/dL Final     Sodium   Date Value Ref Range Status   09/30/2022  140 134 - 144 mmol/L Final   02/11/2019 139 136 - 145 mmol/L Final     Potassium   Date Value Ref Range Status   09/30/2022 4.4 3.5 - 5.2 mmol/L Final   02/11/2019 4.1 3.5 - 5.0 mmol/L Final     Chloride   Date Value Ref Range Status   09/30/2022 101 96 - 106 mmol/L Final   02/11/2019 101 98 - 111 mmol/L Final     CO2   Date Value Ref Range Status   02/11/2019 27 22 - 29 mmol/L Final     Total CO2   Date Value Ref Range Status   09/30/2022 23 20 - 29 mmol/L Final     Calcium   Date Value Ref Range Status   09/30/2022 9.3 8.7 - 10.2 mg/dL Final   02/11/2019 8.7 8.6 - 10.0 mg/dL Final     ALT (SGPT)   Date Value Ref Range Status   09/30/2022 42 0 - 44 IU/L Final     AST (SGOT)   Date Value Ref Range Status   09/30/2022 31 0 - 40 IU/L Final     WBC   Date Value Ref Range Status   09/30/2022 7.5 3.4 - 10.8 x10E3/uL Final   02/11/2019 7.0 4.8 - 10.8 K/uL Final     Hematocrit   Date Value Ref Range Status   09/30/2022 44.3 37.5 - 51.0 % Final   02/11/2019 44.6 42.0 - 52.0 % Final     Platelets   Date Value Ref Range Status   09/30/2022 349 150 - 450 x10E3/uL Final   02/11/2019 300 130 - 400 K/uL Final     Triglycerides   Date Value Ref Range Status   04/01/2022 152 (H) 0 - 149 mg/dL Final     HDL Cholesterol   Date Value Ref Range Status   04/01/2022 30 (L) >39 mg/dL Final     LDL Chol Calc (NIH)   Date Value Ref Range Status   04/01/2022 124 (H) 0 - 99 mg/dL Final     LDL/HDL Ratio   Date Value Ref Range Status   09/28/2014 5.6 (H) 0.0 - 3.4 Final     Hemoglobin A1C   Date Value Ref Range Status   09/30/2022 6.8 % Final         Assessment / Plan     Assessment/Plan:  1. Cough, unspecified type  - POCT SARS-CoV-2 Antigen TODD + Flu    2. Generalized body aches  - POCT SARS-CoV-2 Antigen TODD + Flu    3. Upper respiratory tract infection, unspecified type  - amoxicillin-clavulanate (Augmentin) 875-125 MG per tablet; Take 1 tablet by mouth 2 (Two) Times a Day.  Dispense: 14 tablet; Refill: 0  - methylPREDNISolone (MEDROL) 4  MG dose pack; Take as directed on package instructions.  Dispense: 21 tablet; Refill: 0    Negative covid and flu. Advised rest and increase fluid intake.     Return for Next scheduled follow up. unless patient needs to be seen sooner or acute issues arise.      I have discussed the patient results/orders and and plan/recommendation with them at today's visit.      Kiara Rouse, APRN   10/27/2022

## 2022-11-23 DIAGNOSIS — I10 PRIMARY HYPERTENSION: ICD-10-CM

## 2022-11-23 RX ORDER — VALSARTAN AND HYDROCHLOROTHIAZIDE 80; 12.5 MG/1; MG/1
1 TABLET, FILM COATED ORAL DAILY
Qty: 90 TABLET | Refills: 1 | Status: SHIPPED | OUTPATIENT
Start: 2022-11-23 | End: 2023-03-30 | Stop reason: SDUPTHER

## 2022-11-23 NOTE — TELEPHONE ENCOUNTER
Rx Refill Note  Requested Prescriptions     Pending Prescriptions Disp Refills   • valsartan-hydrochlorothiazide (DIOVAN-HCT) 80-12.5 MG per tablet 90 tablet 1     Sig: Take 1 tablet by mouth Daily.      Last office visit with prescribing clinician: 10/27/2022      Next office visit with prescribing clinician: 12/30/2022            Dian Bentley MA  11/23/22, 12:55 CST

## 2022-12-30 ENCOUNTER — OFFICE VISIT (OUTPATIENT)
Dept: INTERNAL MEDICINE | Facility: CLINIC | Age: 47
End: 2022-12-30

## 2022-12-30 VITALS
TEMPERATURE: 97.3 F | DIASTOLIC BLOOD PRESSURE: 87 MMHG | SYSTOLIC BLOOD PRESSURE: 130 MMHG | BODY MASS INDEX: 33.94 KG/M2 | WEIGHT: 242.4 LBS | HEIGHT: 71 IN | OXYGEN SATURATION: 98 % | HEART RATE: 85 BPM | RESPIRATION RATE: 16 BRPM

## 2022-12-30 DIAGNOSIS — E11.65 TYPE 2 DIABETES MELLITUS WITH HYPERGLYCEMIA, WITHOUT LONG-TERM CURRENT USE OF INSULIN: Primary | ICD-10-CM

## 2022-12-30 DIAGNOSIS — I10 PRIMARY HYPERTENSION: ICD-10-CM

## 2022-12-30 DIAGNOSIS — R73.09 HEMOGLOBIN A1C LESS THAN 7.0%: ICD-10-CM

## 2022-12-30 PROCEDURE — 99214 OFFICE O/P EST MOD 30 MIN: CPT

## 2022-12-30 RX ORDER — AMLODIPINE BESYLATE 5 MG/1
5 TABLET ORAL DAILY
Qty: 90 TABLET | Refills: 1 | Status: SHIPPED | OUTPATIENT
Start: 2022-12-30 | End: 2023-03-30 | Stop reason: SDUPTHER

## 2022-12-30 NOTE — PROGRESS NOTES
Subjective     Chief Complaint   Patient presents with   • Hypertension       History of Present Illness  Patient presents today for hypertension and diabetes follow up. He is doing well and reports compliance with his medication. Has been checking sugar at home, has been running in the 110's - 130's. Tolerating metformin well. States BP at home usually runs in the 120's. Checks it a couple times a week. Denies and chest pain or shortness of breath.     Patient's PMR from outside medical facility reviewed and noted.    Review of Systems   Constitutional: Negative for activity change, fatigue and unexpected weight change.   HENT: Negative for mouth sores and trouble swallowing.    Eyes: Negative for discharge and visual disturbance.   Respiratory: Negative for cough and shortness of breath.    Cardiovascular: Negative for chest pain and leg swelling.   Gastrointestinal: Negative for abdominal pain, constipation, diarrhea and nausea.   Genitourinary: Negative for decreased urine volume, difficulty urinating and hematuria.   Musculoskeletal: Negative for back pain and gait problem.   Skin: Negative for color change and rash.   Allergic/Immunologic: Negative for environmental allergies and immunocompromised state.   Neurological: Negative for weakness and headaches.   Psychiatric/Behavioral: Negative for confusion and sleep disturbance.        Otherwise complete ROS reviewed and negative except as mentioned in the HPI.    Past Medical History:   Past Medical History:   Diagnosis Date   • Hypertension    • Skin cancer      Past Surgical History:History reviewed. No pertinent surgical history.  Social History:  reports that he quit smoking about 23 years ago. His smoking use included cigarettes. He has a 9.00 pack-year smoking history. He quit smokeless tobacco use about 18 years ago. He reports current drug use. Drug: Marijuana. He reports that he does not drink alcohol.    Family History: family history includes  "Diabetes in his mother; Hypertension in his mother.      Allergies:  Allergies   Allergen Reactions   • Lisinopril Other (See Comments)     hypotension     Medications:  Prior to Admission medications    Medication Sig Start Date End Date Taking? Authorizing Provider   amLODIPine (NORVASC) 5 MG tablet Take 1 tablet by mouth Daily. 9/30/22  Yes Kiara Rouse APRN   Blood Glucose Monitoring Suppl ([facility] Blood Glucose Monitor) device 1 each Daily As Needed (glucose monitoring). 9/30/22  Yes Kiara Rouse APRN   Blood Glucose Monitoring Suppl (FreeStyle Freedom Lite) w/Device kit TEST ONCE A DAY AS NEEDED (GLUCOSE MONITORING). 9/30/22  Yes Provider, MD Pavel   diclofenac (VOLTAREN) 50 MG EC tablet Take 1 tablet by mouth 2 (Two) Times a Day As Needed (pain). 8/9/22  Yes Kiara Rouse APRN   fluticasone (FLONASE) 50 MCG/ACT nasal spray 1 spray into the nostril(s) as directed by provider Daily. 9/30/22  Yes Kiara Rouse APRN   glucose blood test strip Use as instructed 7/1/22  Yes Kiara Rouse APRN   metFORMIN (Glucophage) 1000 MG tablet Take 1 tablet by mouth 2 (Two) Times a Day With Meals. 9/30/22  Yes Kiara Rouse APRN   valsartan-hydrochlorothiazide (DIOVAN-HCT) 80-12.5 MG per tablet Take 1 tablet by mouth Daily. 11/23/22  Yes Kiara Rouse APRN   amoxicillin-clavulanate (Augmentin) 875-125 MG per tablet Take 1 tablet by mouth 2 (Two) Times a Day. 10/27/22   Kiara Rouse APRN   methylPREDNISolone (MEDROL) 4 MG dose pack Take as directed on package instructions. 10/27/22   Kiara Rouse APRN       Objective     Vital Signs: /87 (BP Location: Left arm, Patient Position: Sitting, Cuff Size: Adult)   Pulse 85   Temp 97.3 °F (36.3 °C) (Skin)   Resp 16   Ht 180.3 cm (71\")   Wt 110 kg (242 lb 6.4 oz)   SpO2 98%   BMI 33.81 kg/m²   Physical Exam  Constitutional:       General: He is not in acute distress.     Appearance: Normal appearance. He is normal weight. He is " not ill-appearing.   HENT:      Head: Normocephalic and atraumatic.      Nose: Nose normal.      Mouth/Throat:      Mouth: Mucous membranes are moist.      Pharynx: No posterior oropharyngeal erythema.   Eyes:      General: No scleral icterus.     Extraocular Movements: Extraocular movements intact.      Conjunctiva/sclera: Conjunctivae normal.      Pupils: Pupils are equal, round, and reactive to light.   Cardiovascular:      Rate and Rhythm: Normal rate and regular rhythm.      Pulses: Normal pulses.      Heart sounds: Normal heart sounds.   Pulmonary:      Effort: Pulmonary effort is normal. No respiratory distress.      Breath sounds: Normal breath sounds. No wheezing.   Abdominal:      General: Abdomen is flat. Bowel sounds are normal.      Palpations: Abdomen is soft.      Tenderness: There is no abdominal tenderness.   Musculoskeletal:         General: Normal range of motion.      Cervical back: Normal range of motion.      Right lower leg: No edema.      Left lower leg: No edema.   Skin:     General: Skin is warm and dry.      Findings: No erythema or rash.   Neurological:      General: No focal deficit present.      Mental Status: He is alert and oriented to person, place, and time. Mental status is at baseline.      Motor: No weakness.   Psychiatric:         Mood and Affect: Mood normal.         Behavior: Behavior normal.         Thought Content: Thought content normal.         Judgment: Judgment normal.         Results Reviewed:  Glucose   Date Value Ref Range Status   09/30/2022 182 (H) 70 - 99 mg/dL Final     Comment:                   **Please note reference interval change**   02/11/2019 139 (H) 74 - 109 mg/dL Final     BUN   Date Value Ref Range Status   09/30/2022 19 6 - 24 mg/dL Final   02/11/2019 15 6 - 20 mg/dL Final     Creatinine   Date Value Ref Range Status   09/30/2022 0.95 0.76 - 1.27 mg/dL Final   02/11/2019 0.8 0.5 - 1.2 mg/dL Final     Sodium   Date Value Ref Range Status   09/30/2022  140 134 - 144 mmol/L Final   02/11/2019 139 136 - 145 mmol/L Final     Potassium   Date Value Ref Range Status   09/30/2022 4.4 3.5 - 5.2 mmol/L Final   02/11/2019 4.1 3.5 - 5.0 mmol/L Final     Chloride   Date Value Ref Range Status   09/30/2022 101 96 - 106 mmol/L Final   02/11/2019 101 98 - 111 mmol/L Final     CO2   Date Value Ref Range Status   02/11/2019 27 22 - 29 mmol/L Final     Total CO2   Date Value Ref Range Status   09/30/2022 23 20 - 29 mmol/L Final     Calcium   Date Value Ref Range Status   09/30/2022 9.3 8.7 - 10.2 mg/dL Final   02/11/2019 8.7 8.6 - 10.0 mg/dL Final     ALT (SGPT)   Date Value Ref Range Status   09/30/2022 42 0 - 44 IU/L Final     AST (SGOT)   Date Value Ref Range Status   09/30/2022 31 0 - 40 IU/L Final     WBC   Date Value Ref Range Status   09/30/2022 7.5 3.4 - 10.8 x10E3/uL Final   02/11/2019 7.0 4.8 - 10.8 K/uL Final     Hematocrit   Date Value Ref Range Status   09/30/2022 44.3 37.5 - 51.0 % Final   02/11/2019 44.6 42.0 - 52.0 % Final     Platelets   Date Value Ref Range Status   09/30/2022 349 150 - 450 x10E3/uL Final   02/11/2019 300 130 - 400 K/uL Final     Triglycerides   Date Value Ref Range Status   04/01/2022 152 (H) 0 - 149 mg/dL Final     HDL Cholesterol   Date Value Ref Range Status   04/01/2022 30 (L) >39 mg/dL Final     LDL Chol Calc (NIH)   Date Value Ref Range Status   04/01/2022 124 (H) 0 - 99 mg/dL Final     LDL/HDL Ratio   Date Value Ref Range Status   09/28/2014 5.6 (H) 0.0 - 3.4 Final     Hemoglobin A1C   Date Value Ref Range Status   09/30/2022 6.8 % Final         Assessment / Plan     Assessment/Plan:  1. Type 2 diabetes mellitus with hyperglycemia, without long-term current use of insulin (HCC)  - metFORMIN (Glucophage) 1000 MG tablet; Take 1 tablet by mouth 2 (Two) Times a Day With Meals.  Dispense: 90 tablet; Refill: 1  - Hemoglobin A1c    2. Primary hypertension  - CBC w AUTO Differential  - Comprehensive metabolic panel  - amLODIPine (NORVASC) 5 MG  tablet; Take 1 tablet by mouth Daily.  Dispense: 90 tablet; Refill: 1    3. Hemoglobin A1c less than 7.0%  - Hemoglobin A1c    discussed importance of getting diabetic eye exam.     Return in about 3 months (around 3/30/2023) for Recheck. unless patient needs to be seen sooner or acute issues arise.      I have discussed the patient results/orders and and plan/recommendation with them at today's visit.      Kiara Rouse, APRN   12/30/2022

## 2022-12-31 LAB
ALBUMIN SERPL-MCNC: 4.4 G/DL (ref 4–5)
ALBUMIN/GLOB SERPL: 1.5 {RATIO} (ref 1.2–2.2)
ALP SERPL-CCNC: 92 IU/L (ref 44–121)
ALT SERPL-CCNC: 40 IU/L (ref 0–44)
AST SERPL-CCNC: 25 IU/L (ref 0–40)
BASOPHILS # BLD AUTO: 0.1 X10E3/UL (ref 0–0.2)
BASOPHILS NFR BLD AUTO: 1 %
BILIRUB SERPL-MCNC: 0.4 MG/DL (ref 0–1.2)
BUN SERPL-MCNC: 21 MG/DL (ref 6–24)
BUN/CREAT SERPL: 28 (ref 9–20)
CALCIUM SERPL-MCNC: 9.7 MG/DL (ref 8.7–10.2)
CHLORIDE SERPL-SCNC: 97 MMOL/L (ref 96–106)
CO2 SERPL-SCNC: 22 MMOL/L (ref 20–29)
CREAT SERPL-MCNC: 0.76 MG/DL (ref 0.76–1.27)
EGFRCR SERPLBLD CKD-EPI 2021: 111 ML/MIN/1.73
EOSINOPHIL # BLD AUTO: 0.2 X10E3/UL (ref 0–0.4)
EOSINOPHIL NFR BLD AUTO: 3 %
ERYTHROCYTE [DISTWIDTH] IN BLOOD BY AUTOMATED COUNT: 13.3 % (ref 11.6–15.4)
GLOBULIN SER CALC-MCNC: 2.9 G/DL (ref 1.5–4.5)
GLUCOSE SERPL-MCNC: 188 MG/DL (ref 70–99)
HBA1C MFR BLD: 6.9 % (ref 4.8–5.6)
HCT VFR BLD AUTO: 41.5 % (ref 37.5–51)
HGB BLD-MCNC: 14.1 G/DL (ref 13–17.7)
IMM GRANULOCYTES # BLD AUTO: 0 X10E3/UL (ref 0–0.1)
IMM GRANULOCYTES NFR BLD AUTO: 0 %
LYMPHOCYTES # BLD AUTO: 1.4 X10E3/UL (ref 0.7–3.1)
LYMPHOCYTES NFR BLD AUTO: 21 %
MCH RBC QN AUTO: 29.6 PG (ref 26.6–33)
MCHC RBC AUTO-ENTMCNC: 34 G/DL (ref 31.5–35.7)
MCV RBC AUTO: 87 FL (ref 79–97)
MONOCYTES # BLD AUTO: 0.5 X10E3/UL (ref 0.1–0.9)
MONOCYTES NFR BLD AUTO: 8 %
NEUTROPHILS # BLD AUTO: 4.4 X10E3/UL (ref 1.4–7)
NEUTROPHILS NFR BLD AUTO: 67 %
PLATELET # BLD AUTO: 339 X10E3/UL (ref 150–450)
POTASSIUM SERPL-SCNC: 4.6 MMOL/L (ref 3.5–5.2)
PROT SERPL-MCNC: 7.3 G/DL (ref 6–8.5)
RBC # BLD AUTO: 4.77 X10E6/UL (ref 4.14–5.8)
SODIUM SERPL-SCNC: 136 MMOL/L (ref 134–144)
WBC # BLD AUTO: 6.6 X10E3/UL (ref 3.4–10.8)

## 2023-01-03 ENCOUNTER — TELEPHONE (OUTPATIENT)
Dept: INTERNAL MEDICINE | Facility: CLINIC | Age: 48
End: 2023-01-03
Payer: MEDICAID

## 2023-01-03 NOTE — TELEPHONE ENCOUNTER
----- Message from KWAME Anderson sent at 1/3/2023  9:55 AM CST -----  Labs look good. A1c is 6.9.

## 2023-03-03 DIAGNOSIS — J30.2 SEASONAL ALLERGIES: ICD-10-CM

## 2023-03-03 RX ORDER — FLUTICASONE PROPIONATE 50 MCG
1 SPRAY, SUSPENSION (ML) NASAL DAILY
Qty: 11.1 ML | Refills: 1 | Status: SHIPPED | OUTPATIENT
Start: 2023-03-03

## 2023-03-03 NOTE — TELEPHONE ENCOUNTER
Rx Refill Note  Requested Prescriptions     Pending Prescriptions Disp Refills   • fluticasone (FLONASE) 50 MCG/ACT nasal spray 11.1 mL 1     Si spray into the nostril(s) as directed by provider Daily.      Last office visit with prescribing clinician: 2022   Last telemedicine visit with prescribing clinician: 3/30/2023   Next office visit with prescribing clinician: 3/30/2023                         Would you like a call back once the refill request has been completed: [] Yes [] No    If the office needs to give you a call back, can they leave a voicemail: [] Yes [] No    Dian Bentley MA  23, 16:21 CST

## 2023-03-30 ENCOUNTER — OFFICE VISIT (OUTPATIENT)
Dept: INTERNAL MEDICINE | Facility: CLINIC | Age: 48
End: 2023-03-30
Payer: MEDICAID

## 2023-03-30 VITALS
TEMPERATURE: 98.2 F | HEIGHT: 71 IN | SYSTOLIC BLOOD PRESSURE: 135 MMHG | BODY MASS INDEX: 33.43 KG/M2 | HEART RATE: 102 BPM | WEIGHT: 238.8 LBS | DIASTOLIC BLOOD PRESSURE: 87 MMHG | OXYGEN SATURATION: 98 % | RESPIRATION RATE: 18 BRPM

## 2023-03-30 DIAGNOSIS — E11.65 TYPE 2 DIABETES MELLITUS WITH HYPERGLYCEMIA, WITHOUT LONG-TERM CURRENT USE OF INSULIN: Primary | ICD-10-CM

## 2023-03-30 DIAGNOSIS — E78.2 MIXED HYPERLIPIDEMIA: ICD-10-CM

## 2023-03-30 DIAGNOSIS — I10 PRIMARY HYPERTENSION: ICD-10-CM

## 2023-03-30 RX ORDER — AMLODIPINE BESYLATE 5 MG/1
5 TABLET ORAL DAILY
Qty: 90 TABLET | Refills: 1 | Status: SHIPPED | OUTPATIENT
Start: 2023-03-30

## 2023-03-30 RX ORDER — VALSARTAN AND HYDROCHLOROTHIAZIDE 80; 12.5 MG/1; MG/1
1 TABLET, FILM COATED ORAL DAILY
Qty: 90 TABLET | Refills: 1 | Status: SHIPPED | OUTPATIENT
Start: 2023-03-30

## 2023-03-30 NOTE — PROGRESS NOTES
Subjective     Chief Complaint   Patient presents with   • Diabetes     3 month follow up.        Diabetes  He presents for his follow-up diabetic visit. He has type 2 diabetes mellitus. His disease course has been fluctuating. Pertinent negatives for hypoglycemia include no confusion or headaches. Pertinent negatives for diabetes include no chest pain, no fatigue and no weakness. There are no hypoglycemic complications. Risk factors for coronary artery disease include diabetes mellitus, dyslipidemia, hypertension, obesity and male sex. Current diabetic treatment includes oral agent (monotherapy). He is compliant with treatment most of the time. An ACE inhibitor/angiotensin II receptor blocker is being taken.   Hypertension  This is a chronic problem. The current episode started more than 1 year ago. The problem has been waxing and waning since onset. The problem is controlled. Pertinent negatives include no chest pain, headaches or shortness of breath. Risk factors for coronary artery disease include diabetes mellitus, dyslipidemia, male gender and obesity. Current antihypertension treatment includes angiotensin blockers and calcium channel blockers. There are no compliance problems.      Patient presents for diabetes follow up. 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 Reports he has been complaint with his medications, currently takes metformin 1000 mg bid.  Took BP medication this morning, discussed that BP was slightly more elevated today than at previous visit. States that he does check BP at home, admits usually runs in the 120's over 80's.     Patient's PMR from outside medical facility reviewed and noted.    Review of Systems   Constitutional: Negative for activity change, fatigue and unexpected weight change.   HENT: Negative for congestion, ear pain, mouth sores and trouble swallowing.    Eyes: Negative for discharge and visual disturbance.   Respiratory: Negative for cough and shortness of breath.     Cardiovascular: Negative for chest pain and leg swelling.   Gastrointestinal: Negative for abdominal pain, constipation, diarrhea and nausea.   Genitourinary: Negative for decreased urine volume, difficulty urinating and hematuria.   Musculoskeletal: Negative for back pain and gait problem.   Skin: Negative for color change and rash.   Allergic/Immunologic: Negative for environmental allergies and immunocompromised state.   Neurological: Negative for weakness and headaches.   Psychiatric/Behavioral: Negative for confusion and sleep disturbance.        Otherwise complete ROS reviewed and negative except as mentioned in the HPI.    Past Medical History:   Past Medical History:   Diagnosis Date   • Hypertension    • Skin cancer      Past Surgical History:History reviewed. No pertinent surgical history.  Social History:  reports that he quit smoking about 23 years ago. His smoking use included cigarettes. He has a 9.00 pack-year smoking history. He quit smokeless tobacco use about 18 years ago. He reports current drug use. Drug: Marijuana. He reports that he does not drink alcohol.    Family History: family history includes Diabetes in his mother; Hypertension in his mother.      Allergies:  Allergies   Allergen Reactions   • Lisinopril Other (See Comments)     hypotension     Medications:  Prior to Admission medications    Medication Sig Start Date End Date Taking? Authorizing Provider   amLODIPine (NORVASC) 5 MG tablet Take 1 tablet by mouth Daily. 12/30/22  Yes Kiara Rouse APRN   Blood Glucose Monitoring Suppl ([facility] Blood Glucose Monitor) device 1 each Daily As Needed (glucose monitoring). 9/30/22  Yes Kiara Rouse APRN   Blood Glucose Monitoring Suppl (FreeStyle Freedom Lite) w/Device kit TEST ONCE A DAY AS NEEDED (GLUCOSE MONITORING). 9/30/22  Yes Provider, MD Pavel   diclofenac (VOLTAREN) 50 MG EC tablet Take 1 tablet by mouth 2 (Two) Times a Day As Needed (pain). 8/9/22  Yes Nasim  "KWAME Craig   fluticasone (FLONASE) 50 MCG/ACT nasal spray 1 spray into the nostril(s) as directed by provider Daily. 3/3/23  Yes Kiara Rouse APRN   glucose blood test strip Use as instructed 7/1/22  Yes Kiara Rouse APRN   metFORMIN (Glucophage) 1000 MG tablet Take 1 tablet by mouth 2 (Two) Times a Day With Meals. 12/30/22  Yes Kiara Rouse APRN   valsartan-hydrochlorothiazide (DIOVAN-HCT) 80-12.5 MG per tablet Take 1 tablet by mouth Daily. 11/23/22  Yes Kiara Rouse APRN       AURELIO:        PHQ-9 Depression Screening  Little interest or pleasure in doing things? 0-->not at all   Feeling down, depressed, or hopeless? 0-->not at all   Trouble falling or staying asleep, or sleeping too much?     Feeling tired or having little energy?     Poor appetite or overeating?     Feeling bad about yourself - or that you are a failure or have let yourself or your family down?     Trouble concentrating on things, such as reading the newspaper or watching television?     Moving or speaking so slowly that other people could have noticed? Or the opposite - being so fidgety or restless that you have been moving around a lot more than usual?     Thoughts that you would be better off dead, or of hurting yourself in some way?     PHQ-9 Total Score 0   If you checked off any problems, how difficult have these problems made it for you to do your work, take care of things at home, or get along with other people?         PHQ-9 Total Score: 0   0 (Negative screening for depression)  Support given, observe for worsening symptoms    Objective     Vital Signs: /87 (BP Location: Left arm, Patient Position: Sitting, Cuff Size: Adult)   Pulse 102   Temp 98.2 °F (36.8 °C) (Skin)   Resp 18   Ht 180.3 cm (71\")   Wt 108 kg (238 lb 12.8 oz)   SpO2 98%   BMI 33.31 kg/m²   Physical Exam  Vitals and nursing note reviewed.   Constitutional:       General: He is not in acute distress.     Appearance: Normal appearance. He " is obese. He is not ill-appearing.   HENT:      Head: Normocephalic and atraumatic.      Right Ear: External ear normal.      Left Ear: External ear normal.      Nose: Nose normal.      Mouth/Throat:      Mouth: Mucous membranes are moist.      Pharynx: No posterior oropharyngeal erythema.   Eyes:      General: No scleral icterus.     Extraocular Movements: Extraocular movements intact.      Conjunctiva/sclera: Conjunctivae normal.      Pupils: Pupils are equal, round, and reactive to light.   Cardiovascular:      Rate and Rhythm: Normal rate and regular rhythm.      Pulses: Normal pulses.      Heart sounds: Normal heart sounds.   Pulmonary:      Effort: Pulmonary effort is normal. No respiratory distress.      Breath sounds: Normal breath sounds. No wheezing.   Abdominal:      General: Abdomen is flat. Bowel sounds are normal.      Palpations: Abdomen is soft.      Tenderness: There is no abdominal tenderness.   Musculoskeletal:         General: Normal range of motion.      Cervical back: Normal range of motion.      Right lower leg: No edema.      Left lower leg: No edema.   Skin:     General: Skin is warm and dry.      Findings: No erythema or rash.   Neurological:      General: No focal deficit present.      Mental Status: He is alert and oriented to person, place, and time. Mental status is at baseline.      Motor: No weakness.   Psychiatric:         Mood and Affect: Mood normal.         Behavior: Behavior normal.         Thought Content: Thought content normal.         Judgment: Judgment normal.         BMI is >= 30 and <35. (Class 1 Obesity). The following options were offered after discussion;: exercise counseling/recommendations and nutrition counseling/recommendations      Results Reviewed:  Glucose   Date Value Ref Range Status   12/30/2022 188 (H) 70 - 99 mg/dL Final   02/11/2019 139 (H) 74 - 109 mg/dL Final     BUN   Date Value Ref Range Status   12/30/2022 21 6 - 24 mg/dL Final   02/11/2019 15 6 - 20  mg/dL Final     Creatinine   Date Value Ref Range Status   12/30/2022 0.76 0.76 - 1.27 mg/dL Final   02/11/2019 0.8 0.5 - 1.2 mg/dL Final     Sodium   Date Value Ref Range Status   12/30/2022 136 134 - 144 mmol/L Final   02/11/2019 139 136 - 145 mmol/L Final     Potassium   Date Value Ref Range Status   12/30/2022 4.6 3.5 - 5.2 mmol/L Final   02/11/2019 4.1 3.5 - 5.0 mmol/L Final     Chloride   Date Value Ref Range Status   12/30/2022 97 96 - 106 mmol/L Final   02/11/2019 101 98 - 111 mmol/L Final     CO2   Date Value Ref Range Status   02/11/2019 27 22 - 29 mmol/L Final     Total CO2   Date Value Ref Range Status   12/30/2022 22 20 - 29 mmol/L Final     Calcium   Date Value Ref Range Status   12/30/2022 9.7 8.7 - 10.2 mg/dL Final   02/11/2019 8.7 8.6 - 10.0 mg/dL Final     ALT (SGPT)   Date Value Ref Range Status   12/30/2022 40 0 - 44 IU/L Final     AST (SGOT)   Date Value Ref Range Status   12/30/2022 25 0 - 40 IU/L Final     WBC   Date Value Ref Range Status   12/30/2022 6.6 3.4 - 10.8 x10E3/uL Final   02/11/2019 7.0 4.8 - 10.8 K/uL Final     Hematocrit   Date Value Ref Range Status   12/30/2022 41.5 37.5 - 51.0 % Final   02/11/2019 44.6 42.0 - 52.0 % Final     Platelets   Date Value Ref Range Status   12/30/2022 339 150 - 450 x10E3/uL Final   02/11/2019 300 130 - 400 K/uL Final     Triglycerides   Date Value Ref Range Status   04/01/2022 152 (H) 0 - 149 mg/dL Final     HDL Cholesterol   Date Value Ref Range Status   04/01/2022 30 (L) >39 mg/dL Final     LDL Chol Calc (NIH)   Date Value Ref Range Status   04/01/2022 124 (H) 0 - 99 mg/dL Final     LDL/HDL Ratio   Date Value Ref Range Status   09/28/2014 5.6 (H) 0.0 - 3.4 Final     Hemoglobin A1C   Date Value Ref Range Status   12/30/2022 6.9 (H) 4.8 - 5.6 % Final     Comment:              Prediabetes: 5.7 - 6.4           Diabetes: >6.4           Glycemic control for adults with diabetes: <7.0     09/30/2022 6.8 % Final         Assessment / Plan      Assessment/Plan:  1. Type 2 diabetes mellitus with hyperglycemia, without long-term current use of insulin (HCC)  - Hemoglobin A1c  - metFORMIN (Glucophage) 1000 MG tablet; Take 1 tablet by mouth 2 (Two) Times a Day With Meals.  Dispense: 90 tablet; Refill: 1    2. Primary hypertension  - CBC w AUTO Differential  - Comprehensive metabolic panel  - amLODIPine (NORVASC) 5 MG tablet; Take 1 tablet by mouth Daily.  Dispense: 90 tablet; Refill: 1  - valsartan-hydrochlorothiazide (DIOVAN-HCT) 80-12.5 MG per tablet; Take 1 tablet by mouth Daily.  Dispense: 90 tablet; Refill: 1    3. Mixed hyperlipidemia  - Lipid panel      Return in about 3 months (around 6/30/2023) for Recheck. unless patient needs to be seen sooner or acute issues arise.      I have discussed the patient results/orders and and plan/recommendation with them at today's visit.      Kiara Rouse, KWAME   03/30/2023

## 2023-03-31 LAB
ALBUMIN SERPL-MCNC: 4.4 G/DL (ref 4–5)
ALBUMIN/GLOB SERPL: 1.5 {RATIO} (ref 1.2–2.2)
ALP SERPL-CCNC: 95 IU/L (ref 44–121)
ALT SERPL-CCNC: 34 IU/L (ref 0–44)
AST SERPL-CCNC: 26 IU/L (ref 0–40)
BASOPHILS # BLD AUTO: 0.1 X10E3/UL (ref 0–0.2)
BASOPHILS NFR BLD AUTO: 1 %
BILIRUB SERPL-MCNC: 0.4 MG/DL (ref 0–1.2)
BUN SERPL-MCNC: 17 MG/DL (ref 6–24)
BUN/CREAT SERPL: 18 (ref 9–20)
CALCIUM SERPL-MCNC: 9.7 MG/DL (ref 8.7–10.2)
CHLORIDE SERPL-SCNC: 100 MMOL/L (ref 96–106)
CHOLEST SERPL-MCNC: 189 MG/DL (ref 100–199)
CO2 SERPL-SCNC: 24 MMOL/L (ref 20–29)
CREAT SERPL-MCNC: 0.94 MG/DL (ref 0.76–1.27)
EGFRCR SERPLBLD CKD-EPI 2021: 100 ML/MIN/1.73
EOSINOPHIL # BLD AUTO: 0.1 X10E3/UL (ref 0–0.4)
EOSINOPHIL NFR BLD AUTO: 1 %
ERYTHROCYTE [DISTWIDTH] IN BLOOD BY AUTOMATED COUNT: 13 % (ref 11.6–15.4)
GLOBULIN SER CALC-MCNC: 3 G/DL (ref 1.5–4.5)
GLUCOSE SERPL-MCNC: 161 MG/DL (ref 70–99)
HBA1C MFR BLD: 6.9 % (ref 4.8–5.6)
HCT VFR BLD AUTO: 43 % (ref 37.5–51)
HDLC SERPL-MCNC: 32 MG/DL
HGB BLD-MCNC: 14.8 G/DL (ref 13–17.7)
IMM GRANULOCYTES # BLD AUTO: 0 X10E3/UL (ref 0–0.1)
IMM GRANULOCYTES NFR BLD AUTO: 0 %
LDLC SERPL CALC-MCNC: 133 MG/DL (ref 0–99)
LYMPHOCYTES # BLD AUTO: 1.8 X10E3/UL (ref 0.7–3.1)
LYMPHOCYTES NFR BLD AUTO: 26 %
MCH RBC QN AUTO: 29 PG (ref 26.6–33)
MCHC RBC AUTO-ENTMCNC: 34.4 G/DL (ref 31.5–35.7)
MCV RBC AUTO: 84 FL (ref 79–97)
MONOCYTES # BLD AUTO: 0.7 X10E3/UL (ref 0.1–0.9)
MONOCYTES NFR BLD AUTO: 9 %
NEUTROPHILS # BLD AUTO: 4.4 X10E3/UL (ref 1.4–7)
NEUTROPHILS NFR BLD AUTO: 63 %
PLATELET # BLD AUTO: 330 X10E3/UL (ref 150–450)
POTASSIUM SERPL-SCNC: 4.3 MMOL/L (ref 3.5–5.2)
PROT SERPL-MCNC: 7.4 G/DL (ref 6–8.5)
RBC # BLD AUTO: 5.11 X10E6/UL (ref 4.14–5.8)
SODIUM SERPL-SCNC: 140 MMOL/L (ref 134–144)
TRIGL SERPL-MCNC: 131 MG/DL (ref 0–149)
VLDLC SERPL CALC-MCNC: 24 MG/DL (ref 5–40)
WBC # BLD AUTO: 7.1 X10E3/UL (ref 3.4–10.8)

## 2023-03-31 NOTE — PROGRESS NOTES
A1c has stayed the same at 6.9.  Advised him to try not to make as many cakes.  LDL has slightly increased this could be related to increased sugar, carb and fat intake.

## 2023-09-19 DIAGNOSIS — E11.65 TYPE 2 DIABETES MELLITUS WITH HYPERGLYCEMIA, WITHOUT LONG-TERM CURRENT USE OF INSULIN: ICD-10-CM

## 2023-09-19 NOTE — TELEPHONE ENCOUNTER
Rx Refill Note  Requested Prescriptions     Pending Prescriptions Disp Refills    metFORMIN (Glucophage) 1000 MG tablet 90 tablet 1     Sig: Take 1 tablet by mouth 2 (Two) Times a Day With Meals.      Last office visit with prescribing clinician: 6/29/2023   Last telemedicine visit with prescribing clinician: Visit date not found   Next office visit with prescribing clinician: 9/29/2023                         Would you like a call back once the refill request has been completed: [] Yes [] No    If the office needs to give you a call back, can they leave a voicemail: [] Yes [] No    Dian Bentley MA  09/19/23, 11:38 CDT

## 2023-09-29 ENCOUNTER — OFFICE VISIT (OUTPATIENT)
Dept: INTERNAL MEDICINE | Facility: CLINIC | Age: 48
End: 2023-09-29
Payer: MEDICAID

## 2023-09-29 VITALS
HEIGHT: 71 IN | BODY MASS INDEX: 34.5 KG/M2 | TEMPERATURE: 98.2 F | SYSTOLIC BLOOD PRESSURE: 130 MMHG | OXYGEN SATURATION: 100 % | WEIGHT: 246.44 LBS | DIASTOLIC BLOOD PRESSURE: 88 MMHG | HEART RATE: 92 BPM

## 2023-09-29 DIAGNOSIS — I10 PRIMARY HYPERTENSION: ICD-10-CM

## 2023-09-29 DIAGNOSIS — E11.65 TYPE 2 DIABETES MELLITUS WITH HYPERGLYCEMIA, WITHOUT LONG-TERM CURRENT USE OF INSULIN: Primary | ICD-10-CM

## 2023-09-29 DIAGNOSIS — E78.2 MIXED HYPERLIPIDEMIA: ICD-10-CM

## 2023-09-29 PROCEDURE — 1159F MED LIST DOCD IN RCRD: CPT

## 2023-09-29 PROCEDURE — 1160F RVW MEDS BY RX/DR IN RCRD: CPT

## 2023-09-29 PROCEDURE — 3044F HG A1C LEVEL LT 7.0%: CPT

## 2023-09-29 PROCEDURE — 99214 OFFICE O/P EST MOD 30 MIN: CPT

## 2023-09-29 NOTE — PROGRESS NOTES
"        Subjective     Chief Complaint   Patient presents with    Diabetes       History of Present Illness  Pt presents today for a 3 month follow up visit. Pt denies any symptoms or current health concerns at this time. Pt is checking BS at home, states only checking it about once a week. Doesn't remember what his sugars are running. He is currently on metformin 1000mg twice day. Tolerating well. Pt states he took his medication in the parking lot before walking into appointment so has not had time to kick in. Pt takes BP at home once daily, states around \"128/84\". Currently on diovan 80-12.5mg daily along with norvasc 5mg daily for hypertension management.    On statin therapy for hyperlipidemia. Tolerating well. Reports compliance     Patient denies any concerns today. His daughter recently got his license.   Patient's PMR from outside medical facility reviewed and noted.    Review of Systems   Constitutional:  Negative for activity change, fatigue and unexpected weight change.   HENT:  Negative for congestion, ear pain, mouth sores and trouble swallowing.    Eyes:  Negative for discharge and visual disturbance.   Respiratory:  Negative for cough and shortness of breath.    Cardiovascular:  Negative for chest pain and leg swelling.   Gastrointestinal:  Negative for abdominal pain, constipation, diarrhea and nausea.   Genitourinary:  Negative for decreased urine volume, difficulty urinating and hematuria.   Musculoskeletal:  Negative for back pain and gait problem.   Skin:  Negative for color change and rash.   Allergic/Immunologic: Negative for environmental allergies and immunocompromised state.   Neurological:  Negative for speech difficulty, weakness and headaches.   Psychiatric/Behavioral:  Negative for confusion and sleep disturbance. The patient is not nervous/anxious.       Otherwise complete ROS reviewed and negative except as mentioned in the HPI.    Past Medical History:   Past Medical History: "   Diagnosis Date    Hypertension     Skin cancer      Past Surgical History:History reviewed. No pertinent surgical history.  Social History:  reports that he quit smoking about 23 years ago. His smoking use included cigarettes. He has a 9.00 pack-year smoking history. He quit smokeless tobacco use about 18 years ago. He reports current drug use. Drug: Marijuana. He reports that he does not drink alcohol.    Family History: family history includes Diabetes in his mother; Hypertension in his mother.      Allergies:  Allergies   Allergen Reactions    Lisinopril Other (See Comments)     hypotension     Medications:  Prior to Admission medications    Medication Sig Start Date End Date Taking? Authorizing Provider   amLODIPine (NORVASC) 5 MG tablet Take 1 tablet by mouth Daily. 3/30/23  Yes Kiara Rouse APRN   atorvastatin (Lipitor) 10 MG tablet Take 1 tablet by mouth Daily. 7/5/23  Yes Kiara Rouse APRN   Blood Glucose Monitoring Suppl ([facility] Blood Glucose Monitor) device 1 each Daily As Needed (glucose monitoring). 9/30/22  Yes Kiara Rouse APRN   Blood Glucose Monitoring Suppl (FreeStyle Freedom Lite) w/Device kit TEST ONCE A DAY AS NEEDED (GLUCOSE MONITORING). 9/30/22  Yes Provider, MD Pavel   diclofenac (VOLTAREN) 50 MG EC tablet Take 1 tablet by mouth 2 (Two) Times a Day As Needed (pain). 8/9/22  Yes Kiraa Rouse APRN   fluticasone (FLONASE) 50 MCG/ACT nasal spray 1 spray into the nostril(s) as directed by provider Daily. 3/3/23  Yes Kiara Rouse APRN   glucose blood test strip Use as instructed 7/1/22  Yes Kiara Rouse APRN   metFORMIN (Glucophage) 1000 MG tablet Take 1 tablet by mouth 2 (Two) Times a Day With Meals. 9/19/23  Yes Kiara Rouse APRN   valsartan-hydrochlorothiazide (DIOVAN-HCT) 80-12.5 MG per tablet Take 1 tablet by mouth Daily. 3/30/23  Yes Kiara Rouse APRN       Objective     Vital Signs: /88 (BP Location: Left arm, Patient Position: Sitting,  "Cuff Size: Adult)   Pulse 92   Temp 98.2 °F (36.8 °C)   Ht 180.3 cm (71\")   Wt 112 kg (246 lb 7 oz)   SpO2 100%   BMI 34.37 kg/m²   Physical Exam  Constitutional:       General: He is not in acute distress.     Appearance: Normal appearance. He is not ill-appearing.   HENT:      Head: Normocephalic and atraumatic.      Right Ear: External ear normal.      Left Ear: External ear normal.      Nose: Nose normal.      Mouth/Throat:      Mouth: Mucous membranes are moist.      Pharynx: No posterior oropharyngeal erythema.   Eyes:      General: No scleral icterus.     Extraocular Movements: Extraocular movements intact.      Conjunctiva/sclera: Conjunctivae normal.      Pupils: Pupils are equal, round, and reactive to light.   Cardiovascular:      Rate and Rhythm: Normal rate and regular rhythm.      Pulses: Normal pulses.      Heart sounds: Normal heart sounds.   Pulmonary:      Effort: Pulmonary effort is normal. No respiratory distress.      Breath sounds: Normal breath sounds. No wheezing.   Abdominal:      General: Abdomen is flat. Bowel sounds are normal.      Palpations: Abdomen is soft.      Tenderness: There is no abdominal tenderness.   Musculoskeletal:         General: Normal range of motion.      Cervical back: Normal range of motion.      Right lower leg: No edema.      Left lower leg: No edema.   Skin:     General: Skin is warm and dry.      Findings: No erythema or rash.   Neurological:      General: No focal deficit present.      Mental Status: He is alert and oriented to person, place, and time. Mental status is at baseline.      Motor: No weakness.   Psychiatric:         Mood and Affect: Mood normal.         Behavior: Behavior normal.         Thought Content: Thought content normal.         Judgment: Judgment normal.       Results Reviewed:  Glucose   Date Value Ref Range Status   06/29/2023 167 (H) 70 - 99 mg/dL Final   02/11/2019 139 (H) 74 - 109 mg/dL Final     BUN   Date Value Ref Range Status "   06/29/2023 22 6 - 24 mg/dL Final   02/11/2019 15 6 - 20 mg/dL Final     Creatinine   Date Value Ref Range Status   06/29/2023 0.89 0.76 - 1.27 mg/dL Final   02/11/2019 0.8 0.5 - 1.2 mg/dL Final     Sodium   Date Value Ref Range Status   06/29/2023 140 134 - 144 mmol/L Final   02/11/2019 139 136 - 145 mmol/L Final     Potassium   Date Value Ref Range Status   06/29/2023 4.2 3.5 - 5.2 mmol/L Final   02/11/2019 4.1 3.5 - 5.0 mmol/L Final     Chloride   Date Value Ref Range Status   06/29/2023 97 96 - 106 mmol/L Final   02/11/2019 101 98 - 111 mmol/L Final     CO2   Date Value Ref Range Status   02/11/2019 27 22 - 29 mmol/L Final     Total CO2   Date Value Ref Range Status   06/29/2023 24 20 - 29 mmol/L Final     Calcium   Date Value Ref Range Status   06/29/2023 9.7 8.7 - 10.2 mg/dL Final   02/11/2019 8.7 8.6 - 10.0 mg/dL Final     ALT (SGPT)   Date Value Ref Range Status   06/29/2023 33 0 - 44 IU/L Final     AST (SGOT)   Date Value Ref Range Status   06/29/2023 24 0 - 40 IU/L Final     WBC   Date Value Ref Range Status   06/29/2023 6.2 3.4 - 10.8 x10E3/uL Final   02/11/2019 7.0 4.8 - 10.8 K/uL Final     Hematocrit   Date Value Ref Range Status   06/29/2023 44.5 37.5 - 51.0 % Final   02/11/2019 44.6 42.0 - 52.0 % Final     Platelets   Date Value Ref Range Status   06/29/2023 337 150 - 450 x10E3/uL Final   02/11/2019 300 130 - 400 K/uL Final     Triglycerides   Date Value Ref Range Status   06/29/2023 278 (H) 0 - 149 mg/dL Final     HDL Cholesterol   Date Value Ref Range Status   06/29/2023 30 (L) >39 mg/dL Final     LDL Chol Calc (NIH)   Date Value Ref Range Status   06/29/2023 121 (H) 0 - 99 mg/dL Final     LDL/HDL Ratio   Date Value Ref Range Status   09/28/2014 5.6 (H) 0.0 - 3.4 Final     Hemoglobin A1C   Date Value Ref Range Status   06/29/2023 6.8 % Final         Assessment / Plan     Assessment/Plan:  1. Type 2 diabetes mellitus with hyperglycemia, without long-term current use of insulin  - CBC w AUTO  Differential  - Comprehensive metabolic panel  - Hemoglobin A1c    2. Mixed hyperlipidemia  - CBC w AUTO Differential  - Comprehensive metabolic panel  - Lipid panel    3. Primary hypertension  - CBC w AUTO Differential  - Comprehensive metabolic panel        Return in about 3 months (around 1/2/2024) for Recheck. unless patient needs to be seen sooner or acute issues arise.      I have discussed the patient results/orders and and plan/recommendation with them at today's visit.      Kiara Rouse, APRN   09/29/2023

## 2023-09-29 NOTE — PROGRESS NOTES
"        Subjective     Chief Complaint   Patient presents with    Diabetes       History of Present Illness  Pt presents today for a 3 month follow up visit. Pt denies any symptoms or current health concerns at this time.  Pt is checking BS at home, states only checking it about once a week. Doesn't remember what his sugars are running. Pt states he took his medication right before coming today.  Pt takes BP at home once daily, states around \"128/84\".    Patient's PMR from outside medical facility reviewed and noted.    Review of Systems   Constitutional:  Negative for activity change, chills, fatigue and unexpected weight change.   HENT:  Negative for ear pain, mouth sores and trouble swallowing.    Eyes:  Negative for discharge and visual disturbance.   Respiratory:  Negative for cough and shortness of breath.    Cardiovascular:  Negative for chest pain and leg swelling.   Gastrointestinal:  Negative for abdominal pain, constipation, diarrhea and nausea.   Genitourinary:  Negative for decreased urine volume, difficulty urinating and hematuria.   Musculoskeletal:  Negative for back pain and gait problem.   Skin:  Negative for color change and rash.   Allergic/Immunologic: Negative for environmental allergies and immunocompromised state.   Neurological:  Negative for weakness and headaches.   Psychiatric/Behavioral:  Negative for confusion and sleep disturbance.    All other systems reviewed and are negative.     Otherwise complete ROS reviewed and negative except as mentioned in the HPI.    Past Medical History:   Past Medical History:   Diagnosis Date    Hypertension     Skin cancer      Past Surgical History:No past surgical history on file.  Social History:  reports that he quit smoking about 23 years ago. His smoking use included cigarettes. He has a 9.00 pack-year smoking history. He quit smokeless tobacco use about 18 years ago. He reports current drug use. Drug: Marijuana. He reports that he does not drink " "alcohol.    Family History: family history includes Diabetes in his mother; Hypertension in his mother.      Allergies:  Allergies   Allergen Reactions    Lisinopril Other (See Comments)     hypotension     Medications:  Prior to Admission medications    Medication Sig Start Date End Date Taking? Authorizing Provider   amLODIPine (NORVASC) 5 MG tablet Take 1 tablet by mouth Daily. 3/30/23  Yes Kiara Rouse APRN   atorvastatin (Lipitor) 10 MG tablet Take 1 tablet by mouth Daily. 7/5/23  Yes Kiara Rouse APRN   Blood Glucose Monitoring Suppl ([facility] Blood Glucose Monitor) device 1 each Daily As Needed (glucose monitoring). 9/30/22  Yes Kiara Rouse APRN   Blood Glucose Monitoring Suppl (FreeStyle Freedom Lite) w/Device kit TEST ONCE A DAY AS NEEDED (GLUCOSE MONITORING). 9/30/22  Yes Provider, MD Pavel   diclofenac (VOLTAREN) 50 MG EC tablet Take 1 tablet by mouth 2 (Two) Times a Day As Needed (pain). 8/9/22  Yes Kiara Rouse APRN   fluticasone (FLONASE) 50 MCG/ACT nasal spray 1 spray into the nostril(s) as directed by provider Daily. 3/3/23  Yes Kiara Rouse APRN   glucose blood test strip Use as instructed 7/1/22  Yes Kiara Rouse APRN   metFORMIN (Glucophage) 1000 MG tablet Take 1 tablet by mouth 2 (Two) Times a Day With Meals. 9/19/23  Yes Kiara Rouse APRN   valsartan-hydrochlorothiazide (DIOVAN-HCT) 80-12.5 MG per tablet Take 1 tablet by mouth Daily. 3/30/23  Yes Kiara Rouse APRN         Objective     Vital Signs: /97 (BP Location: Left arm, Patient Position: Sitting, Cuff Size: Adult)   Pulse 92   Temp 98.2 °F (36.8 °C)   Ht 180.3 cm (71\")   Wt 112 kg (246 lb 7 oz)   SpO2 100%   BMI 34.37 kg/m²   Physical Exam  Vitals and nursing note reviewed.   Constitutional:       General: He is not in acute distress.     Appearance: Normal appearance. He is not ill-appearing.   HENT:      Head: Normocephalic and atraumatic.      Right Ear: External ear normal.     "  Left Ear: External ear normal.      Nose: Nose normal.      Mouth/Throat:      Mouth: Mucous membranes are moist.      Pharynx: No posterior oropharyngeal erythema.   Eyes:      General: No scleral icterus.     Extraocular Movements: Extraocular movements intact.      Conjunctiva/sclera: Conjunctivae normal.      Pupils: Pupils are equal, round, and reactive to light.   Cardiovascular:      Rate and Rhythm: Normal rate and regular rhythm.      Pulses: Normal pulses.      Heart sounds: Normal heart sounds.   Pulmonary:      Effort: Pulmonary effort is normal. No respiratory distress.      Breath sounds: Normal breath sounds. No wheezing.   Abdominal:      General: Abdomen is flat. Bowel sounds are normal.      Palpations: Abdomen is soft.      Tenderness: There is no abdominal tenderness.   Musculoskeletal:         General: Normal range of motion.      Cervical back: Normal range of motion.      Right lower leg: No edema.      Left lower leg: No edema.   Skin:     General: Skin is warm and dry.      Findings: No erythema or rash.   Neurological:      General: No focal deficit present.      Mental Status: He is alert and oriented to person, place, and time. Mental status is at baseline.      Motor: No weakness.   Psychiatric:         Mood and Affect: Mood normal.         Behavior: Behavior normal.         Thought Content: Thought content normal.         Judgment: Judgment normal.         Results Reviewed:  Glucose   Date Value Ref Range Status   06/29/2023 167 (H) 70 - 99 mg/dL Final   02/11/2019 139 (H) 74 - 109 mg/dL Final     BUN   Date Value Ref Range Status   06/29/2023 22 6 - 24 mg/dL Final   02/11/2019 15 6 - 20 mg/dL Final     Creatinine   Date Value Ref Range Status   06/29/2023 0.89 0.76 - 1.27 mg/dL Final   02/11/2019 0.8 0.5 - 1.2 mg/dL Final     Sodium   Date Value Ref Range Status   06/29/2023 140 134 - 144 mmol/L Final   02/11/2019 139 136 - 145 mmol/L Final     Potassium   Date Value Ref Range Status    06/29/2023 4.2 3.5 - 5.2 mmol/L Final   02/11/2019 4.1 3.5 - 5.0 mmol/L Final     Chloride   Date Value Ref Range Status   06/29/2023 97 96 - 106 mmol/L Final   02/11/2019 101 98 - 111 mmol/L Final     CO2   Date Value Ref Range Status   02/11/2019 27 22 - 29 mmol/L Final     Total CO2   Date Value Ref Range Status   06/29/2023 24 20 - 29 mmol/L Final     Calcium   Date Value Ref Range Status   06/29/2023 9.7 8.7 - 10.2 mg/dL Final   02/11/2019 8.7 8.6 - 10.0 mg/dL Final     ALT (SGPT)   Date Value Ref Range Status   06/29/2023 33 0 - 44 IU/L Final     AST (SGOT)   Date Value Ref Range Status   06/29/2023 24 0 - 40 IU/L Final     WBC   Date Value Ref Range Status   06/29/2023 6.2 3.4 - 10.8 x10E3/uL Final   02/11/2019 7.0 4.8 - 10.8 K/uL Final     Hematocrit   Date Value Ref Range Status   06/29/2023 44.5 37.5 - 51.0 % Final   02/11/2019 44.6 42.0 - 52.0 % Final     Platelets   Date Value Ref Range Status   06/29/2023 337 150 - 450 x10E3/uL Final   02/11/2019 300 130 - 400 K/uL Final     Triglycerides   Date Value Ref Range Status   06/29/2023 278 (H) 0 - 149 mg/dL Final     HDL Cholesterol   Date Value Ref Range Status   06/29/2023 30 (L) >39 mg/dL Final     LDL Chol Calc (NIH)   Date Value Ref Range Status   06/29/2023 121 (H) 0 - 99 mg/dL Final     LDL/HDL Ratio   Date Value Ref Range Status   09/28/2014 5.6 (H) 0.0 - 3.4 Final     Hemoglobin A1C   Date Value Ref Range Status   06/29/2023 6.8 % Final         Assessment / Plan     Assessment/Plan:  1. Type 2 diabetes mellitus with hyperglycemia, without long-term current use of insulin  ***    2. Mixed hyperlipidemia  ***    3. Primary hypertension  ***        No follow-ups on file. unless patient needs to be seen sooner or acute issues arise.      I have discussed the patient results/orders and and plan/recommendation with them at today's visit.      Kiara Rouse, APRN   09/29/2023

## 2023-09-30 LAB
ALBUMIN SERPL-MCNC: 4.3 G/DL (ref 4.1–5.1)
ALBUMIN/GLOB SERPL: 1.6 {RATIO} (ref 1.2–2.2)
ALP SERPL-CCNC: 100 IU/L (ref 44–121)
ALT SERPL-CCNC: 32 IU/L (ref 0–44)
AST SERPL-CCNC: 22 IU/L (ref 0–40)
BASOPHILS # BLD AUTO: 0.1 X10E3/UL (ref 0–0.2)
BASOPHILS NFR BLD AUTO: 1 %
BILIRUB SERPL-MCNC: 0.2 MG/DL (ref 0–1.2)
BUN SERPL-MCNC: 17 MG/DL (ref 6–24)
BUN/CREAT SERPL: 18 (ref 9–20)
CALCIUM SERPL-MCNC: 9.2 MG/DL (ref 8.7–10.2)
CHLORIDE SERPL-SCNC: 102 MMOL/L (ref 96–106)
CHOLEST SERPL-MCNC: 178 MG/DL (ref 100–199)
CO2 SERPL-SCNC: 26 MMOL/L (ref 20–29)
CREAT SERPL-MCNC: 0.94 MG/DL (ref 0.76–1.27)
EGFRCR SERPLBLD CKD-EPI 2021: 100 ML/MIN/1.73
EOSINOPHIL # BLD AUTO: 0.1 X10E3/UL (ref 0–0.4)
EOSINOPHIL NFR BLD AUTO: 2 %
ERYTHROCYTE [DISTWIDTH] IN BLOOD BY AUTOMATED COUNT: 13.1 % (ref 11.6–15.4)
GLOBULIN SER CALC-MCNC: 2.7 G/DL (ref 1.5–4.5)
GLUCOSE SERPL-MCNC: 150 MG/DL (ref 70–99)
HBA1C MFR BLD: 7.4 % (ref 4.8–5.6)
HCT VFR BLD AUTO: 42.5 % (ref 37.5–51)
HDLC SERPL-MCNC: 29 MG/DL
HGB BLD-MCNC: 13.7 G/DL (ref 13–17.7)
IMM GRANULOCYTES # BLD AUTO: 0 X10E3/UL (ref 0–0.1)
IMM GRANULOCYTES NFR BLD AUTO: 0 %
LDLC SERPL CALC-MCNC: 118 MG/DL (ref 0–99)
LYMPHOCYTES # BLD AUTO: 1.4 X10E3/UL (ref 0.7–3.1)
LYMPHOCYTES NFR BLD AUTO: 20 %
MCH RBC QN AUTO: 28.9 PG (ref 26.6–33)
MCHC RBC AUTO-ENTMCNC: 32.2 G/DL (ref 31.5–35.7)
MCV RBC AUTO: 90 FL (ref 79–97)
MONOCYTES # BLD AUTO: 0.6 X10E3/UL (ref 0.1–0.9)
MONOCYTES NFR BLD AUTO: 8 %
NEUTROPHILS # BLD AUTO: 4.8 X10E3/UL (ref 1.4–7)
NEUTROPHILS NFR BLD AUTO: 69 %
PLATELET # BLD AUTO: 300 X10E3/UL (ref 150–450)
POTASSIUM SERPL-SCNC: 4.1 MMOL/L (ref 3.5–5.2)
PROT SERPL-MCNC: 7 G/DL (ref 6–8.5)
RBC # BLD AUTO: 4.74 X10E6/UL (ref 4.14–5.8)
SODIUM SERPL-SCNC: 143 MMOL/L (ref 134–144)
TRIGL SERPL-MCNC: 173 MG/DL (ref 0–149)
VLDLC SERPL CALC-MCNC: 31 MG/DL (ref 5–40)
WBC # BLD AUTO: 6.9 X10E3/UL (ref 3.4–10.8)

## 2023-10-03 DIAGNOSIS — E11.65 TYPE 2 DIABETES MELLITUS WITH HYPERGLYCEMIA, WITHOUT LONG-TERM CURRENT USE OF INSULIN: Primary | ICD-10-CM

## 2023-10-03 RX ORDER — ATORVASTATIN CALCIUM 20 MG/1
20 TABLET, FILM COATED ORAL DAILY
Qty: 90 TABLET | Refills: 3 | Status: SHIPPED | OUTPATIENT
Start: 2023-10-03

## 2023-10-03 RX ORDER — GLIPIZIDE 5 MG/1
5 TABLET ORAL
Qty: 120 TABLET | Refills: 0 | Status: SHIPPED | OUTPATIENT
Start: 2023-10-03

## 2023-11-15 DIAGNOSIS — I10 PRIMARY HYPERTENSION: ICD-10-CM

## 2023-11-15 RX ORDER — VALSARTAN AND HYDROCHLOROTHIAZIDE 80; 12.5 MG/1; MG/1
1 TABLET, FILM COATED ORAL DAILY
Qty: 90 TABLET | Refills: 1 | Status: SHIPPED | OUTPATIENT
Start: 2023-11-15

## 2023-11-15 NOTE — TELEPHONE ENCOUNTER
Rx Refill Note  Requested Prescriptions     Pending Prescriptions Disp Refills    valsartan-hydrochlorothiazide (DIOVAN-HCT) 80-12.5 MG per tablet 90 tablet 1     Sig: Take 1 tablet by mouth Daily.      Last office visit with prescribing clinician: 9/29/2023   Last telemedicine visit with prescribing clinician: Visit date not found   Next office visit with prescribing clinician: 12/29/2023                         Would you like a call back once the refill request has been completed: [] Yes [] No    If the office needs to give you a call back, can they leave a voicemail: [] Yes [] No    Dian Bentley MA  11/15/23, 16:28 CST

## 2023-12-05 DIAGNOSIS — E11.65 TYPE 2 DIABETES MELLITUS WITH HYPERGLYCEMIA, WITHOUT LONG-TERM CURRENT USE OF INSULIN: ICD-10-CM

## 2023-12-05 RX ORDER — GLIPIZIDE 5 MG/1
5 TABLET ORAL
Qty: 120 TABLET | Refills: 0 | Status: SHIPPED | OUTPATIENT
Start: 2023-12-05

## 2023-12-05 NOTE — TELEPHONE ENCOUNTER
Rx Refill Note  Requested Prescriptions     Pending Prescriptions Disp Refills    glipizide (Glucotrol) 5 MG tablet 120 tablet 0     Sig: Take 1 tablet by mouth 2 (Two) Times a Day Before Meals.      Last office visit with prescribing clinician: 9/29/2023   Last telemedicine visit with prescribing clinician: Visit date not found   Next office visit with prescribing clinician: 12/29/2023                         Would you like a call back once the refill request has been completed: [] Yes [] No    If the office needs to give you a call back, can they leave a voicemail: [] Yes [] No    Dian Bentley MA  12/05/23, 10:36 CST

## 2023-12-28 DIAGNOSIS — I10 PRIMARY HYPERTENSION: ICD-10-CM

## 2023-12-28 RX ORDER — AMLODIPINE BESYLATE 5 MG/1
5 TABLET ORAL DAILY
Qty: 90 TABLET | Refills: 1 | Status: SHIPPED | OUTPATIENT
Start: 2023-12-28

## 2023-12-28 NOTE — TELEPHONE ENCOUNTER
Rx Refill Note  Requested Prescriptions     Pending Prescriptions Disp Refills    amLODIPine (NORVASC) 5 MG tablet 90 tablet 1     Sig: Take 1 tablet by mouth Daily.      Last office visit with prescribing clinician: 9/29/2023   Last telemedicine visit with prescribing clinician: Visit date not found   Next office visit with prescribing clinician: 12/29/2023                         Would you like a call back once the refill request has been completed: [] Yes [] No    If the office needs to give you a call back, can they leave a voicemail: [] Yes [] No    Dian Bentley MA  12/28/23, 09:42 CST

## 2023-12-29 ENCOUNTER — OFFICE VISIT (OUTPATIENT)
Dept: INTERNAL MEDICINE | Facility: CLINIC | Age: 48
End: 2023-12-29
Payer: MEDICAID

## 2023-12-29 VITALS
DIASTOLIC BLOOD PRESSURE: 93 MMHG | HEART RATE: 109 BPM | HEIGHT: 71 IN | SYSTOLIC BLOOD PRESSURE: 137 MMHG | TEMPERATURE: 98.1 F | RESPIRATION RATE: 17 BRPM | BODY MASS INDEX: 34.44 KG/M2 | OXYGEN SATURATION: 99 % | WEIGHT: 246 LBS

## 2023-12-29 DIAGNOSIS — E78.2 MIXED HYPERLIPIDEMIA: ICD-10-CM

## 2023-12-29 DIAGNOSIS — E11.65 TYPE 2 DIABETES MELLITUS WITH HYPERGLYCEMIA, WITHOUT LONG-TERM CURRENT USE OF INSULIN: Primary | ICD-10-CM

## 2023-12-29 DIAGNOSIS — I10 PRIMARY HYPERTENSION: ICD-10-CM

## 2023-12-29 PROCEDURE — 1159F MED LIST DOCD IN RCRD: CPT

## 2023-12-29 PROCEDURE — 99214 OFFICE O/P EST MOD 30 MIN: CPT

## 2023-12-29 PROCEDURE — 3051F HG A1C>EQUAL 7.0%<8.0%: CPT

## 2023-12-29 PROCEDURE — 1160F RVW MEDS BY RX/DR IN RCRD: CPT

## 2023-12-29 RX ORDER — ATORVASTATIN CALCIUM 20 MG/1
20 TABLET, FILM COATED ORAL DAILY
Qty: 90 TABLET | Refills: 3 | Status: SHIPPED | OUTPATIENT
Start: 2023-12-29

## 2023-12-29 RX ORDER — GLIPIZIDE 5 MG/1
5 TABLET ORAL
Qty: 120 TABLET | Refills: 0 | Status: SHIPPED | OUTPATIENT
Start: 2023-12-29

## 2023-12-29 NOTE — PROGRESS NOTES
Subjective     Chief Complaint   Patient presents with    Diabetes     Follow up.        Diabetes  Pertinent negatives for hypoglycemia include no confusion or headaches. Pertinent negatives for diabetes include no chest pain, no fatigue and no weakness.     The patient is a 48-year-old male who presents for evaluation of multiple medical concerns.    He continues to take metformin 1000 mg twice a day. He does not check his blood glucose at home. He is not taking glipizide 5 mg twice a day. Does not want to take additional pills. States diet has not been the greatest.     He has not been taking Lipitor. He denies any chest pain or dyspnea.    He takes amlodipine, valsartan and hydrochlorothiazide for hypertension. Has not taken yet today.     He fell off the barn trying to fix a leak on the roof while he was 17-years-old. He had a bilateral hand laceration and fractured his back. He has a scar tissue which becomes thickend and hardened on his hand and it breaks down sometimes, especially in his pollex. He applies lotion and put on [employer]ank gloves while working which helps. He also uses [employer].     His mother has muscle aches.    Patient's PMR from outside medical facility reviewed and noted.    Review of Systems   Constitutional:  Negative for activity change, fatigue and unexpected weight change.   HENT:  Negative for mouth sores and trouble swallowing.    Eyes:  Negative for discharge and visual disturbance.   Respiratory:  Negative for cough and shortness of breath.    Cardiovascular:  Negative for chest pain and leg swelling.   Gastrointestinal:  Negative for abdominal pain, constipation, diarrhea and nausea.   Genitourinary:  Negative for decreased urine volume, difficulty urinating and hematuria.   Musculoskeletal:  Negative for back pain and gait problem.   Skin:  Negative for color change and rash.   Allergic/Immunologic: Negative for environmental allergies and immunocompromised state.    Neurological:  Negative for weakness and headaches.   Psychiatric/Behavioral:  Negative for confusion and sleep disturbance.         Otherwise complete ROS reviewed and negative except as mentioned in the HPI.    Past Medical History:   Past Medical History:   Diagnosis Date    Hypertension     Skin cancer      Past Surgical History:History reviewed. No pertinent surgical history.  Social History:  reports that he quit smoking about 24 years ago. His smoking use included cigarettes. He has a 9.00 pack-year smoking history. He quit smokeless tobacco use about 19 years ago. He reports current drug use. Drug: Marijuana. He reports that he does not drink alcohol.    Family History: family history includes Diabetes in his mother; Hypertension in his mother.      Allergies:  Allergies   Allergen Reactions    Lisinopril Other (See Comments)     hypotension     Medications:  Prior to Admission medications    Medication Sig Start Date End Date Taking? Authorizing Provider   amLODIPine (NORVASC) 5 MG tablet Take 1 tablet by mouth Daily. 12/28/23  Yes Kiara Rouse APRN   atorvastatin (Lipitor) 20 MG tablet Take 1 tablet by mouth Daily. 10/3/23  Yes Kiara Rouse APRN   Blood Glucose Monitoring Suppl ([facility] Blood Glucose Monitor) device 1 each Daily As Needed (glucose monitoring). 9/30/22  Yes Kiara Rouse APRN   Blood Glucose Monitoring Suppl (FreeStyle Freedom Lite) w/Device kit TEST ONCE A DAY AS NEEDED (GLUCOSE MONITORING). 9/30/22  Yes Provider, MD Pavel   diclofenac (VOLTAREN) 50 MG EC tablet Take 1 tablet by mouth 2 (Two) Times a Day As Needed (pain). 8/9/22  Yes Kiara Rouse APRN   fluticasone (FLONASE) 50 MCG/ACT nasal spray 1 spray into the nostril(s) as directed by provider Daily. 3/3/23  Yes Kiara Rouse APRN   glipizide (Glucotrol) 5 MG tablet Take 1 tablet by mouth 2 (Two) Times a Day Before Meals. 12/5/23  Yes Kiara Rouse APRN   glucose blood test strip Use as instructed  "7/1/22  Yes Kiara Rouse APRN   metFORMIN (Glucophage) 1000 MG tablet Take 1 tablet by mouth 2 (Two) Times a Day With Meals. 9/19/23  Yes Kiara Rouse APRN   valsartan-hydrochlorothiazide (DIOVAN-HCT) 80-12.5 MG per tablet Take 1 tablet by mouth Daily. 11/15/23  Yes Kiara Rouse APRN       Objective     Vital Signs: /93 (BP Location: Left arm, Patient Position: Sitting, Cuff Size: Adult)   Pulse 109   Temp 98.1 °F (36.7 °C) (Skin)   Resp 17   Ht 180.3 cm (71\")   Wt 112 kg (246 lb)   SpO2 99%   BMI 34.31 kg/m²   Physical Exam  Vitals and nursing note reviewed.   Constitutional:       General: He is not in acute distress.     Appearance: Normal appearance. He is not ill-appearing.   HENT:      Head: Normocephalic and atraumatic.      Right Ear: External ear normal.      Left Ear: External ear normal.      Nose: Nose normal.      Mouth/Throat:      Mouth: Mucous membranes are moist.      Pharynx: No posterior oropharyngeal erythema.   Eyes:      General: No scleral icterus.     Extraocular Movements: Extraocular movements intact.      Conjunctiva/sclera: Conjunctivae normal.      Pupils: Pupils are equal, round, and reactive to light.   Cardiovascular:      Rate and Rhythm: Normal rate and regular rhythm.      Pulses: Normal pulses.      Heart sounds: Normal heart sounds.   Pulmonary:      Effort: Pulmonary effort is normal. No respiratory distress.      Breath sounds: Normal breath sounds. No wheezing.   Abdominal:      General: Abdomen is flat. Bowel sounds are normal.      Palpations: Abdomen is soft.      Tenderness: There is no abdominal tenderness.   Musculoskeletal:         General: Normal range of motion.      Cervical back: Normal range of motion.      Right lower leg: No edema.      Left lower leg: No edema.   Feet:      Right foot:      Protective Sensation: 10 sites tested.  10 sites sensed.      Left foot:      Protective Sensation: 10 sites tested.  10 sites sensed.   Skin:     " General: Skin is warm and dry.      Findings: No erythema or rash.   Neurological:      General: No focal deficit present.      Mental Status: He is alert and oriented to person, place, and time. Mental status is at baseline.      Motor: No weakness.   Psychiatric:         Mood and Affect: Mood normal.         Behavior: Behavior normal.         Thought Content: Thought content normal.         Judgment: Judgment normal.           Results Reviewed:  Glucose   Date Value Ref Range Status   09/29/2023 150 (H) 70 - 99 mg/dL Final   02/11/2019 139 (H) 74 - 109 mg/dL Final     BUN   Date Value Ref Range Status   09/29/2023 17 6 - 24 mg/dL Final   02/11/2019 15 6 - 20 mg/dL Final     Creatinine   Date Value Ref Range Status   09/29/2023 0.94 0.76 - 1.27 mg/dL Final   02/11/2019 0.8 0.5 - 1.2 mg/dL Final     Sodium   Date Value Ref Range Status   09/29/2023 143 134 - 144 mmol/L Final   02/11/2019 139 136 - 145 mmol/L Final     Potassium   Date Value Ref Range Status   09/29/2023 4.1 3.5 - 5.2 mmol/L Final   02/11/2019 4.1 3.5 - 5.0 mmol/L Final     Chloride   Date Value Ref Range Status   09/29/2023 102 96 - 106 mmol/L Final   02/11/2019 101 98 - 111 mmol/L Final     CO2   Date Value Ref Range Status   02/11/2019 27 22 - 29 mmol/L Final     Total CO2   Date Value Ref Range Status   09/29/2023 26 20 - 29 mmol/L Final     Calcium   Date Value Ref Range Status   09/29/2023 9.2 8.7 - 10.2 mg/dL Final   02/11/2019 8.7 8.6 - 10.0 mg/dL Final     ALT (SGPT)   Date Value Ref Range Status   09/29/2023 32 0 - 44 IU/L Final     AST (SGOT)   Date Value Ref Range Status   09/29/2023 22 0 - 40 IU/L Final     WBC   Date Value Ref Range Status   09/29/2023 6.9 3.4 - 10.8 x10E3/uL Final   02/11/2019 7.0 4.8 - 10.8 K/uL Final     Hematocrit   Date Value Ref Range Status   09/29/2023 42.5 37.5 - 51.0 % Final   02/11/2019 44.6 42.0 - 52.0 % Final     Platelets   Date Value Ref Range Status   09/29/2023 300 150 - 450 x10E3/uL Final   02/11/2019  300 130 - 400 K/uL Final     Triglycerides   Date Value Ref Range Status   09/29/2023 173 (H) 0 - 149 mg/dL Final     HDL Cholesterol   Date Value Ref Range Status   09/29/2023 29 (L) >39 mg/dL Final     LDL Chol Calc (NIH)   Date Value Ref Range Status   09/29/2023 118 (H) 0 - 99 mg/dL Final     LDL/HDL Ratio   Date Value Ref Range Status   09/28/2014 5.6 (H) 0.0 - 3.4 Final     Hemoglobin A1C   Date Value Ref Range Status   09/29/2023 7.4 (H) 4.8 - 5.6 % Final     Comment:              Prediabetes: 5.7 - 6.4           Diabetes: >6.4           Glycemic control for adults with diabetes: <7.0     06/29/2023 6.8 % Final         Assessment / Plan     Assessment/Plan:  1. Type 2 diabetes mellitus with hyperglycemia, without long-term current use of insulin  - MicroAlbumin, Urine, Random - Urine, Clean Catch  - Hemoglobin A1c  - glipizide (Glucotrol) 5 MG tablet; Take 1 tablet by mouth 2 (Two) Times a Day Before Meals.  Dispense: 120 tablet; Refill: 0    2. Primary hypertension  - CBC w AUTO Differential  - Comprehensive metabolic panel    3. Mixed hyperlipidemia  - Lipid panel  - atorvastatin (Lipitor) 20 MG tablet; Take 1 tablet by mouth Daily.  Dispense: 90 tablet; Refill: 3        Return in about 4 months (around 5/1/2024) for T2DM follow up. unless patient needs to be seen sooner or acute issues arise.      I have discussed the patient results/orders and and plan/recommendation with them at today's visit.      KWAME Anderson   12/29/2023    Transcribed from ambient dictation for KWAME Anderson by Elpidio Rodriguez.  12/29/23   09:04 CST    Patient or patient representative verbalized consent to the visit recording.  I have personally performed the services described in this document as transcribed by the above individual, and it is both accurate and complete.

## 2023-12-30 LAB
ALBUMIN SERPL-MCNC: 4.2 G/DL (ref 4.1–5.1)
ALBUMIN/GLOB SERPL: 1.5 {RATIO} (ref 1.2–2.2)
ALP SERPL-CCNC: 103 IU/L (ref 44–121)
ALT SERPL-CCNC: 33 IU/L (ref 0–44)
AST SERPL-CCNC: 25 IU/L (ref 0–40)
BASOPHILS # BLD AUTO: 0.1 X10E3/UL (ref 0–0.2)
BASOPHILS NFR BLD AUTO: 1 %
BILIRUB SERPL-MCNC: 0.5 MG/DL (ref 0–1.2)
BUN SERPL-MCNC: 17 MG/DL (ref 6–24)
BUN/CREAT SERPL: 18 (ref 9–20)
CALCIUM SERPL-MCNC: 9 MG/DL (ref 8.7–10.2)
CHLORIDE SERPL-SCNC: 100 MMOL/L (ref 96–106)
CHOLEST SERPL-MCNC: 184 MG/DL (ref 100–199)
CO2 SERPL-SCNC: 20 MMOL/L (ref 20–29)
CREAT SERPL-MCNC: 0.96 MG/DL (ref 0.76–1.27)
EGFRCR SERPLBLD CKD-EPI 2021: 97 ML/MIN/1.73
EOSINOPHIL # BLD AUTO: 0.1 X10E3/UL (ref 0–0.4)
EOSINOPHIL NFR BLD AUTO: 2 %
ERYTHROCYTE [DISTWIDTH] IN BLOOD BY AUTOMATED COUNT: 13.2 % (ref 11.6–15.4)
GLOBULIN SER CALC-MCNC: 2.8 G/DL (ref 1.5–4.5)
GLUCOSE SERPL-MCNC: 234 MG/DL (ref 70–99)
HBA1C MFR BLD: 7.3 % (ref 4.8–5.6)
HCT VFR BLD AUTO: 44.1 % (ref 37.5–51)
HDLC SERPL-MCNC: 30 MG/DL
HGB BLD-MCNC: 14.6 G/DL (ref 13–17.7)
IMM GRANULOCYTES # BLD AUTO: 0 X10E3/UL (ref 0–0.1)
IMM GRANULOCYTES NFR BLD AUTO: 0 %
LDLC SERPL CALC-MCNC: 129 MG/DL (ref 0–99)
LYMPHOCYTES # BLD AUTO: 1.1 X10E3/UL (ref 0.7–3.1)
LYMPHOCYTES NFR BLD AUTO: 18 %
MCH RBC QN AUTO: 29.3 PG (ref 26.6–33)
MCHC RBC AUTO-ENTMCNC: 33.1 G/DL (ref 31.5–35.7)
MCV RBC AUTO: 89 FL (ref 79–97)
MICROALBUMIN UR-MCNC: NORMAL UG/ML
MONOCYTES # BLD AUTO: 0.5 X10E3/UL (ref 0.1–0.9)
MONOCYTES NFR BLD AUTO: 8 %
NEUTROPHILS # BLD AUTO: 4.3 X10E3/UL (ref 1.4–7)
NEUTROPHILS NFR BLD AUTO: 71 %
PLATELET # BLD AUTO: 293 X10E3/UL (ref 150–450)
POTASSIUM SERPL-SCNC: 4.6 MMOL/L (ref 3.5–5.2)
PROT SERPL-MCNC: 7 G/DL (ref 6–8.5)
RBC # BLD AUTO: 4.98 X10E6/UL (ref 4.14–5.8)
SODIUM SERPL-SCNC: 136 MMOL/L (ref 134–144)
SPECIMEN STATUS: NORMAL
TRIGL SERPL-MCNC: 137 MG/DL (ref 0–149)
VLDLC SERPL CALC-MCNC: 25 MG/DL (ref 5–40)
WBC # BLD AUTO: 6 X10E3/UL (ref 3.4–10.8)

## 2024-01-04 DIAGNOSIS — E78.2 MIXED HYPERLIPIDEMIA: Primary | ICD-10-CM

## 2024-01-04 RX ORDER — ATORVASTATIN CALCIUM 20 MG/1
30 TABLET, FILM COATED ORAL DAILY
Qty: 90 TABLET | Refills: 0 | Status: SHIPPED | OUTPATIENT
Start: 2024-01-04

## 2024-07-03 ENCOUNTER — OFFICE VISIT (OUTPATIENT)
Dept: INTERNAL MEDICINE | Facility: CLINIC | Age: 49
End: 2024-07-03
Payer: MEDICAID

## 2024-07-03 VITALS
DIASTOLIC BLOOD PRESSURE: 83 MMHG | SYSTOLIC BLOOD PRESSURE: 126 MMHG | HEIGHT: 71 IN | WEIGHT: 252.3 LBS | TEMPERATURE: 98.7 F | BODY MASS INDEX: 35.32 KG/M2 | HEART RATE: 89 BPM | RESPIRATION RATE: 18 BRPM | OXYGEN SATURATION: 96 %

## 2024-07-03 DIAGNOSIS — R53.83 OTHER FATIGUE: ICD-10-CM

## 2024-07-03 DIAGNOSIS — E53.8 VITAMIN B12 DEFICIENCY: ICD-10-CM

## 2024-07-03 DIAGNOSIS — E78.2 MIXED HYPERLIPIDEMIA: ICD-10-CM

## 2024-07-03 DIAGNOSIS — E11.641 TYPE 2 DIABETES MELLITUS WITH HYPOGLYCEMIA AND COMA, WITHOUT LONG-TERM CURRENT USE OF INSULIN: ICD-10-CM

## 2024-07-03 DIAGNOSIS — I10 PRIMARY HYPERTENSION: ICD-10-CM

## 2024-07-03 DIAGNOSIS — Z23 NEED FOR TDAP VACCINATION: ICD-10-CM

## 2024-07-03 DIAGNOSIS — S51.811A LACERATION OF RIGHT FOREARM, INITIAL ENCOUNTER: ICD-10-CM

## 2024-07-03 DIAGNOSIS — E11.65 TYPE 2 DIABETES MELLITUS WITH HYPERGLYCEMIA, WITHOUT LONG-TERM CURRENT USE OF INSULIN: ICD-10-CM

## 2024-07-03 DIAGNOSIS — E55.9 VITAMIN D DEFICIENCY: ICD-10-CM

## 2024-07-03 DIAGNOSIS — Z12.5 SCREENING FOR MALIGNANT NEOPLASM OF PROSTATE: ICD-10-CM

## 2024-07-03 DIAGNOSIS — Z00.00 ROUTINE GENERAL MEDICAL EXAMINATION AT A HEALTH CARE FACILITY: Primary | ICD-10-CM

## 2024-07-03 DIAGNOSIS — L98.9 SKIN LESIONS: ICD-10-CM

## 2024-07-03 PROCEDURE — 1159F MED LIST DOCD IN RCRD: CPT

## 2024-07-03 PROCEDURE — 90715 TDAP VACCINE 7 YRS/> IM: CPT

## 2024-07-03 PROCEDURE — 90471 IMMUNIZATION ADMIN: CPT

## 2024-07-03 PROCEDURE — 1160F RVW MEDS BY RX/DR IN RCRD: CPT

## 2024-07-03 PROCEDURE — 99396 PREV VISIT EST AGE 40-64: CPT

## 2024-07-03 PROCEDURE — 1125F AMNT PAIN NOTED PAIN PRSNT: CPT

## 2024-07-03 RX ORDER — VALSARTAN AND HYDROCHLOROTHIAZIDE 80; 12.5 MG/1; MG/1
1 TABLET, FILM COATED ORAL DAILY
Qty: 90 TABLET | Refills: 1 | Status: SHIPPED | OUTPATIENT
Start: 2024-07-03

## 2024-07-03 RX ORDER — ATORVASTATIN CALCIUM 20 MG/1
30 TABLET, FILM COATED ORAL DAILY
Qty: 90 TABLET | Refills: 0 | Status: SHIPPED | OUTPATIENT
Start: 2024-07-03

## 2024-07-03 RX ORDER — AMLODIPINE BESYLATE 5 MG/1
5 TABLET ORAL DAILY
Qty: 90 TABLET | Refills: 1 | Status: SHIPPED | OUTPATIENT
Start: 2024-07-03

## 2024-07-03 RX ORDER — GLIPIZIDE 5 MG/1
5 TABLET ORAL
Qty: 120 TABLET | Refills: 0 | Status: SHIPPED | OUTPATIENT
Start: 2024-07-03

## 2024-07-03 NOTE — PROGRESS NOTES
Subjective     Chief Complaint   Patient presents with    Annual Exam       History of Present Illness  History of Present Illness  The patient presents for evaluation of annual physical.    The patient has not been monitoring his blood glucose levels at home. His dietary habits have been suboptimal, with recent consumption of cakes and brownies. He is uncertain if he has undergone a diabetic eye examination. He denies experiencing chest pain or shortness of breath. His urinary function is normal. His current medication regimen includes metformin, glipizide, and Lipitor.    Has never had colonoscopy.  Patient's PMR from outside medical facility reviewed and noted.    Review of Systems   Constitutional:  Negative for fatigue.   Psychiatric/Behavioral:  Negative for dysphoric mood. The patient is not nervous/anxious.         Otherwise complete ROS reviewed and negative except as mentioned in the HPI.    Past Medical History:   Past Medical History:   Diagnosis Date    Hypertension     Skin cancer      Past Surgical History:History reviewed. No pertinent surgical history.  Social History:  reports that he quit smoking about 24 years ago. His smoking use included cigarettes. He started smoking about 33 years ago. He has a 9 pack-year smoking history. He quit smokeless tobacco use about 19 years ago. He reports current drug use. Drug: Marijuana. He reports that he does not drink alcohol.    Family History: family history includes Diabetes in his mother; Hypertension in his mother.      Allergies:  Allergies   Allergen Reactions    Lisinopril Other (See Comments)     hypotension     Medications:  Prior to Admission medications    Medication Sig Start Date End Date Taking? Authorizing Provider   amLODIPine (NORVASC) 5 MG tablet Take 1 tablet by mouth Daily. 5/1/24  Yes Kiara Rouse APRN   atorvastatin (Lipitor) 20 MG tablet Take 1.5 tablets by mouth Daily. 5/1/24  Yes Kiara Rouse APRN   Blood Glucose  Monitoring Suppl ([facility] Blood Glucose Monitor) device 1 each Daily As Needed (glucose monitoring). 9/30/22  Yes Kiara Rouse APRN   Blood Glucose Monitoring Suppl (FreeStyle Freedom Lite) w/Device kit TEST ONCE A DAY AS NEEDED (GLUCOSE MONITORING). 9/30/22  Yes Provider, MD Pavel   diclofenac (VOLTAREN) 50 MG EC tablet Take 1 tablet by mouth 2 (Two) Times a Day As Needed (pain). 8/9/22  Yes Kiara Rouse APRN   fluticasone (FLONASE) 50 MCG/ACT nasal spray 1 spray into the nostril(s) as directed by provider Daily. 3/3/23  Yes Kiara Rouse APRN   glipizide (Glucotrol) 5 MG tablet Take 1 tablet by mouth 2 (Two) Times a Day Before Meals. 5/1/24  Yes Kiara Rouse APRN   glucose blood test strip Use as instructed 7/1/22  Yes Kiara oRuse APRN   metFORMIN (Glucophage) 1000 MG tablet Take 1 tablet by mouth 2 (Two) Times a Day With Meals. 5/1/24  Yes Kiara Rouse APRN   valsartan-hydrochlorothiazide (DIOVAN-HCT) 80-12.5 MG per tablet Take 1 tablet by mouth Daily. 5/1/24  Yes Kiara Rouse APRN       AURELIO:        PHQ-9 Depression Screening  Little interest or pleasure in doing things?     Feeling down, depressed, or hopeless?     Trouble falling or staying asleep, or sleeping too much?     Feeling tired or having little energy?     Poor appetite or overeating?     Feeling bad about yourself - or that you are a failure or have let yourself or your family down?     Trouble concentrating on things, such as reading the newspaper or watching television?     Moving or speaking so slowly that other people could have noticed? Or the opposite - being so fidgety or restless that you have been moving around a lot more than usual?     Thoughts that you would be better off dead, or of hurting yourself in some way?     PHQ-9 Total Score     If you checked off any problems, how difficult have these problems made it for you to do your work, take care of things at home, or get along with other people?   "       PHQ-9 Total Score:         Objective     Vital Signs: /83 (BP Location: Left arm, Patient Position: Sitting, Cuff Size: Large Adult)   Pulse 89   Temp 98.7 °F (37.1 °C) (Skin)   Resp 18   Ht 180.3 cm (71\")   Wt 114 kg (252 lb 4.8 oz)   SpO2 96%   BMI 35.19 kg/m²   Physical Exam  Vitals and nursing note reviewed.   Constitutional:       General: He is not in acute distress.     Appearance: Normal appearance. He is not ill-appearing.   HENT:      Head: Normocephalic and atraumatic.      Right Ear: External ear normal.      Left Ear: External ear normal.      Nose: Nose normal.      Mouth/Throat:      Mouth: Mucous membranes are moist.      Pharynx: No posterior oropharyngeal erythema.   Eyes:      General: No scleral icterus.     Extraocular Movements: Extraocular movements intact.      Conjunctiva/sclera: Conjunctivae normal.      Pupils: Pupils are equal, round, and reactive to light.   Cardiovascular:      Rate and Rhythm: Normal rate and regular rhythm.      Pulses: Normal pulses.      Heart sounds: Normal heart sounds.   Pulmonary:      Effort: Pulmonary effort is normal. No respiratory distress.      Breath sounds: Normal breath sounds. No wheezing.   Abdominal:      General: Abdomen is flat. Bowel sounds are normal.      Palpations: Abdomen is soft.      Tenderness: There is no abdominal tenderness.   Musculoskeletal:         General: Normal range of motion.      Cervical back: Normal range of motion.      Right lower leg: No edema.      Left lower leg: No edema.   Skin:     General: Skin is warm and dry.      Findings: No erythema or rash.   Neurological:      General: No focal deficit present.      Mental Status: He is alert and oriented to person, place, and time. Mental status is at baseline.      Motor: No weakness.   Psychiatric:         Mood and Affect: Mood normal.         Behavior: Behavior normal.         Thought Content: Thought content normal.         Judgment: Judgment normal. "         Class 2 Severe Obesity (BMI >=35 and <=39.9). Obesity-related health conditions include the following: hypertension, diabetes mellitus, and dyslipidemias. Obesity is unchanged. BMI is is above average; no BMI management plan is appropriate. We discussed portion control and increasing exercise.      Advance Care Planning   ACP discussion was held with the patient during this visit.         Results Reviewed:  Glucose   Date Value Ref Range Status   12/28/2023 234 (H) 70 - 99 mg/dL Final   02/11/2019 139 (H) 74 - 109 mg/dL Final     BUN   Date Value Ref Range Status   12/28/2023 17 6 - 24 mg/dL Final   02/11/2019 15 6 - 20 mg/dL Final     Creatinine   Date Value Ref Range Status   12/28/2023 0.96 0.76 - 1.27 mg/dL Final   02/11/2019 0.8 0.5 - 1.2 mg/dL Final     Sodium   Date Value Ref Range Status   12/28/2023 136 134 - 144 mmol/L Final   02/11/2019 139 136 - 145 mmol/L Final     Potassium   Date Value Ref Range Status   12/28/2023 4.6 3.5 - 5.2 mmol/L Final   02/11/2019 4.1 3.5 - 5.0 mmol/L Final     Chloride   Date Value Ref Range Status   12/28/2023 100 96 - 106 mmol/L Final   02/11/2019 101 98 - 111 mmol/L Final     CO2   Date Value Ref Range Status   02/11/2019 27 22 - 29 mmol/L Final     Total CO2   Date Value Ref Range Status   12/28/2023 20 20 - 29 mmol/L Final     Calcium   Date Value Ref Range Status   12/28/2023 9.0 8.7 - 10.2 mg/dL Final   02/11/2019 8.7 8.6 - 10.0 mg/dL Final     ALT (SGPT)   Date Value Ref Range Status   12/28/2023 33 0 - 44 IU/L Final     AST (SGOT)   Date Value Ref Range Status   12/28/2023 25 0 - 40 IU/L Final     WBC   Date Value Ref Range Status   12/28/2023 6.0 3.4 - 10.8 x10E3/uL Final   02/11/2019 7.0 4.8 - 10.8 K/uL Final     Hematocrit   Date Value Ref Range Status   12/28/2023 44.1 37.5 - 51.0 % Final   02/11/2019 44.6 42.0 - 52.0 % Final     Platelets   Date Value Ref Range Status   12/28/2023 293 150 - 450 x10E3/uL Final   02/11/2019 300 130 - 400 K/uL Final      Triglycerides   Date Value Ref Range Status   12/28/2023 137 0 - 149 mg/dL Final     HDL Cholesterol   Date Value Ref Range Status   12/28/2023 30 (L) >39 mg/dL Final     LDL Chol Calc (NIH)   Date Value Ref Range Status   12/28/2023 129 (H) 0 - 99 mg/dL Final     LDL/HDL Ratio   Date Value Ref Range Status   09/28/2014 5.6 (H) 0.0 - 3.4 Final     Hemoglobin A1C   Date Value Ref Range Status   12/28/2023 7.3 (H) 4.8 - 5.6 % Final     Comment:              Prediabetes: 5.7 - 6.4           Diabetes: >6.4           Glycemic control for adults with diabetes: <7.0     06/29/2023 6.8 % Final         Assessment / Plan     Assessment/Plan:    1. Routine general medical examination at a health care facility  .Will have lab work here today. Discussed testicular self exams, patient is aware how to do testicular exams and the importance of same. Discussed weight management and importance of maintaining a healthy weight. Discussed Vitamin D intake and the importance of adequate vitamin D for both Bone Health and a healthy immune system.  Discussed daily exercise and the importance of same, in regards to a healthy heart as well as helping to maintain his weight and improving his mental health.  BMI  .       2. Type 2 diabetes mellitus with hypoglycemia and coma, without long-term current use of insulin  - CBC & Differential  - Comprehensive Metabolic Panel  - Hemoglobin A1c  - MicroAlbumin, Urine, Random - Urine, Clean Catch    3. Mixed hyperlipidemia  - Lipid Panel  - atorvastatin (Lipitor) 20 MG tablet; Take 1.5 tablets by mouth Daily.  Dispense: 90 tablet; Refill: 0    4. Primary hypertension  - amLODIPine (NORVASC) 5 MG tablet; Take 1 tablet by mouth Daily.  Dispense: 90 tablet; Refill: 1  - valsartan-hydrochlorothiazide (DIOVAN-HCT) 80-12.5 MG per tablet; Take 1 tablet by mouth Daily.  Dispense: 90 tablet; Refill: 1    5. Vitamin B12 deficiency  - Vitamin B12    6. Vitamin D deficiency  - Vitamin D,25-Hydroxy    7. Other  fatigue    - T4, Free  - TSH    8. Screening for malignant neoplasm of prostate  - PSA SCREENING    9. Need for Tdap vaccination  - Tdap Vaccine => 8yo IM (BOOSTRIX/ADACEL)    10. Laceration of right forearm, initial encounter  - Tdap Vaccine => 8yo IM (BOOSTRIX/ADACEL)    11. Type 2 diabetes mellitus with hyperglycemia, without long-term current use of insulin  - glipizide (Glucotrol) 5 MG tablet; Take 1 tablet by mouth 2 (Two) Times a Day Before Meals.  Dispense: 120 tablet; Refill: 0  - metFORMIN (Glucophage) 1000 MG tablet; Take 1 tablet by mouth 2 (Two) Times a Day With Meals.  Dispense: 90 tablet; Refill: 1    12. Skin lesions  - Ambulatory Referral to Dermatology    Diagnoses and all orders for this visit:    1. Routine general medical examination at a health care facility (Primary)    2. Type 2 diabetes mellitus with hypoglycemia and coma, without long-term current use of insulin  -     Cancel: CBC & Differential  -     Cancel: Comprehensive Metabolic Panel  -     Cancel: Hemoglobin A1c  -     Cancel: MicroAlbumin, Urine, Random - Urine, Clean Catch  -     CBC & Differential  -     Comprehensive Metabolic Panel  -     Hemoglobin A1c  -     MicroAlbumin, Urine, Random - Urine, Clean Catch    3. Mixed hyperlipidemia  -     Cancel: Lipid Panel  -     Lipid Panel  -     atorvastatin (Lipitor) 20 MG tablet; Take 1.5 tablets by mouth Daily.  Dispense: 90 tablet; Refill: 0    4. Primary hypertension  -     amLODIPine (NORVASC) 5 MG tablet; Take 1 tablet by mouth Daily.  Dispense: 90 tablet; Refill: 1  -     valsartan-hydrochlorothiazide (DIOVAN-HCT) 80-12.5 MG per tablet; Take 1 tablet by mouth Daily.  Dispense: 90 tablet; Refill: 1    5. Vitamin B12 deficiency  -     Cancel: Vitamin B12  -     Vitamin B12    6. Vitamin D deficiency  -     Cancel: Vitamin D,25-Hydroxy  -     Vitamin D,25-Hydroxy    7. Other fatigue  -     Cancel: TSH  -     Cancel: T4, Free  -     T4, Free  -     TSH    8. Screening for malignant  neoplasm of prostate  -     Cancel: PSA SCREENING  -     PSA SCREENING    9. Need for Tdap vaccination  -     Tdap Vaccine => 6yo IM (BOOSTRIX/ADACEL)    10. Laceration of right forearm, initial encounter  -     Tdap Vaccine => 5yo IM (BOOSTRIX/ADACEL)    11. Type 2 diabetes mellitus with hyperglycemia, without long-term current use of insulin  -     glipizide (Glucotrol) 5 MG tablet; Take 1 tablet by mouth 2 (Two) Times a Day Before Meals.  Dispense: 120 tablet; Refill: 0  -     metFORMIN (Glucophage) 1000 MG tablet; Take 1 tablet by mouth 2 (Two) Times a Day With Meals.  Dispense: 90 tablet; Refill: 1    12. Skin lesions  -     Ambulatory Referral to Dermatology        Assessment & Plan  1. Diabetes.  The patient was advised to monitor his blood glucose levels at home.    Return in about 3 months (around 10/3/2024) for Recheck. unless patient needs to be seen sooner or acute issues arise.      I have discussed the patient results/orders and and plan/recommendation with them at today's visit.    Patient or patient representative verbalized consent for the use of Ambient Listening during the visit with  KWAME Anderson for chart documentation. 7/3/2024  08:32 CDT  KWAME Anderson   07/03/2024

## 2024-07-03 NOTE — LETTER
[facility]  Vaccine Consent Form    Patient Name:  Wil Middleton  Patient :  1975     Vaccine(s) Ordered    Tdap Vaccine => 7yo IM (BOOSTRIX/ADACEL)        Screening Checklist  The following questions should be completed prior to vaccination. If you answer “yes” to any question, it does not necessarily mean you should not be vaccinated. It just means we may need to clarify or ask more questions. If a question is unclear, please ask your healthcare provider to explain it.    Yes No   Any fever or moderate to severe illness today (mild illness and/or antibiotic treatment are not contraindications)?     Do you have a history of a serious reaction to any previous vaccinations, such as anaphylaxis, encephalopathy within 7 days, Guillain-Riverton syndrome within 6 weeks, seizure?     Have you received any live vaccine(s) (e.g MMR, GALLO) or any other vaccines in the last month (to ensure duplicate doses aren't given)?     Do you have an anaphylactic allergy to latex (DTaP, DTaP-IPV, Hep A, Hep B, MenB, RV, Td, Tdap), baker’s yeast (Hep B, HPV), polysorbates (RSV, nirsevimab, PCV 20, Rotavirrus, Tdap, Shingrix), or gelatin (GALLO, MMR)?     Do you have an anaphylactic allergy to neomycin (Rabies, GALLO, MMR, IPV, Hep A), polymyxin B (IPV), or streptomycin (IPV)?      Any cancer, leukemia, AIDS, or other immune system disorder? (GALLO, MMR, RV)     Do you have a parent, brother, or sister with an immune system problem (if immune competence of vaccine recipient clinically verified, can proceed)? (MMR, GALLO)     Any recent steroid treatments for >2 weeks, chemotherapy, or radiation treatment? (GALLO, MMR)     Have you received antibody-containing blood transfusions or IVIG in the past 11 months (recommended interval is dependent on product)? (MMR, GALLO)     Have you taken antiviral drugs (acyclovir, famciclovir, valacyclovir for GALLO) in the last 24 or 48 hours, respectively?      Are you pregnant or planning to become pregnant  "within 1 month? (GALLO, MMR, HPV, IPV, MenB, Abrexvy; For Hep B- refer to Engerix-B; For RSV - Abrysvo is indicated for 32-36 weeks of pregnancy from September to January)     For infants, have you ever been told your child has had intussusception or a medical emergency involving obstruction of the intestine (Rotavirus)? If not for an infant, can skip this question.         *Ordering Physicians/APC should be consulted if \"yes\" is checked by the patient or guardian above.  I have received, read, and understand the Vaccine Information Statement (VIS) for each vaccine ordered.  I have considered my or my child's health status as well as the health status of my close contacts.  I have taken the opportunity to discuss my vaccine questions with my or my child's health care provider.   I have requested that the ordered vaccine(s) be given to me or my child.  I understand the benefits and risks of the vaccines.  I understand that I should remain in the clinic for 15 minutes after receiving the vaccine(s).  _________________________________________________________  Signature of Patient or Parent/Legal Guardian ____________________  Date     "

## 2024-07-04 LAB
25(OH)D3+25(OH)D2 SERPL-MCNC: 19.4 NG/ML (ref 30–100)
ALBUMIN SERPL-MCNC: 4.2 G/DL (ref 4.1–5.1)
ALP SERPL-CCNC: 121 IU/L (ref 44–121)
ALT SERPL-CCNC: 39 IU/L (ref 0–44)
AST SERPL-CCNC: 23 IU/L (ref 0–40)
BASOPHILS # BLD AUTO: 0.1 X10E3/UL (ref 0–0.2)
BASOPHILS NFR BLD AUTO: 1 %
BILIRUB SERPL-MCNC: 0.2 MG/DL (ref 0–1.2)
BUN SERPL-MCNC: 18 MG/DL (ref 6–24)
BUN/CREAT SERPL: 20 (ref 9–20)
CALCIUM SERPL-MCNC: 9.4 MG/DL (ref 8.7–10.2)
CHLORIDE SERPL-SCNC: 97 MMOL/L (ref 96–106)
CHOLEST SERPL-MCNC: 138 MG/DL (ref 100–199)
CO2 SERPL-SCNC: 24 MMOL/L (ref 20–29)
CREAT SERPL-MCNC: 0.91 MG/DL (ref 0.76–1.27)
EGFRCR SERPLBLD CKD-EPI 2021: 103 ML/MIN/1.73
EOSINOPHIL # BLD AUTO: 0.1 X10E3/UL (ref 0–0.4)
EOSINOPHIL NFR BLD AUTO: 1 %
ERYTHROCYTE [DISTWIDTH] IN BLOOD BY AUTOMATED COUNT: 13 % (ref 11.6–15.4)
GLOBULIN SER CALC-MCNC: 3 G/DL (ref 1.5–4.5)
GLUCOSE SERPL-MCNC: 220 MG/DL (ref 70–99)
HBA1C MFR BLD: 8.3 % (ref 4.8–5.6)
HCT VFR BLD AUTO: 43.9 % (ref 37.5–51)
HDLC SERPL-MCNC: 26 MG/DL
HGB BLD-MCNC: 14.9 G/DL (ref 13–17.7)
IMM GRANULOCYTES # BLD AUTO: 0 X10E3/UL (ref 0–0.1)
IMM GRANULOCYTES NFR BLD AUTO: 0 %
LDLC SERPL CALC-MCNC: 62 MG/DL (ref 0–99)
LYMPHOCYTES # BLD AUTO: 1.5 X10E3/UL (ref 0.7–3.1)
LYMPHOCYTES NFR BLD AUTO: 18 %
MCH RBC QN AUTO: 30.3 PG (ref 26.6–33)
MCHC RBC AUTO-ENTMCNC: 33.9 G/DL (ref 31.5–35.7)
MCV RBC AUTO: 89 FL (ref 79–97)
MICROALBUMIN UR-MCNC: 19.9 UG/ML
MONOCYTES # BLD AUTO: 0.6 X10E3/UL (ref 0.1–0.9)
MONOCYTES NFR BLD AUTO: 7 %
NEUTROPHILS # BLD AUTO: 6.3 X10E3/UL (ref 1.4–7)
NEUTROPHILS NFR BLD AUTO: 73 %
PLATELET # BLD AUTO: 297 X10E3/UL (ref 150–450)
POTASSIUM SERPL-SCNC: 4.4 MMOL/L (ref 3.5–5.2)
PROT SERPL-MCNC: 7.2 G/DL (ref 6–8.5)
PSA SERPL-MCNC: 0.2 NG/ML (ref 0–4)
RBC # BLD AUTO: 4.92 X10E6/UL (ref 4.14–5.8)
SODIUM SERPL-SCNC: 136 MMOL/L (ref 134–144)
T4 FREE SERPL-MCNC: 0.85 NG/DL (ref 0.82–1.77)
TRIGL SERPL-MCNC: 315 MG/DL (ref 0–149)
TSH SERPL DL<=0.005 MIU/L-ACNC: 1.9 UIU/ML (ref 0.45–4.5)
VIT B12 SERPL-MCNC: 484 PG/ML (ref 232–1245)
VLDLC SERPL CALC-MCNC: 50 MG/DL (ref 5–40)
WBC # BLD AUTO: 8.6 X10E3/UL (ref 3.4–10.8)

## 2024-07-09 DIAGNOSIS — E55.9 VITAMIN D DEFICIENCY: Primary | ICD-10-CM

## 2024-07-09 RX ORDER — ERGOCALCIFEROL 1.25 MG/1
50000 CAPSULE ORAL WEEKLY
Qty: 10 CAPSULE | Refills: 3 | Status: SHIPPED | OUTPATIENT
Start: 2024-07-09

## 2024-07-10 ENCOUNTER — TELEPHONE (OUTPATIENT)
Dept: INTERNAL MEDICINE | Facility: CLINIC | Age: 49
End: 2024-07-10

## 2024-07-10 NOTE — TELEPHONE ENCOUNTER
Hub staff attempted to follow warm transfer process and was unsuccessful     Caller: Wil Middleton    Relationship to patient: Self    Best call back number: [phone]     Patient is needing: PATIENT WAS RETURNING A CALL. PLEASE ADVISE.

## 2024-07-10 NOTE — TELEPHONE ENCOUNTER
"Returned call to patient to discuss lab results. He voiced understanding. He has taken the vitamin D and he will start 1/5 tablets of lipitor, he thought it was just one tablet. He, however, doesn't want to take the ozempic. He states he \"hates\" needles. I advised I would let him know how PCP wants to proceed. He had no questions.   "

## 2024-07-11 ENCOUNTER — PRIOR AUTHORIZATION (OUTPATIENT)
Dept: INTERNAL MEDICINE | Facility: CLINIC | Age: 49
End: 2024-07-11
Payer: MEDICAID

## 2024-07-11 DIAGNOSIS — E11.641 TYPE 2 DIABETES MELLITUS WITH HYPOGLYCEMIA AND COMA, WITHOUT LONG-TERM CURRENT USE OF INSULIN: Primary | ICD-10-CM

## 2024-07-11 RX ORDER — ORAL SEMAGLUTIDE 3 MG/1
3 TABLET ORAL DAILY
Qty: 30 TABLET | Refills: 1 | Status: SHIPPED | OUTPATIENT
Start: 2024-07-11

## 2024-07-11 NOTE — TELEPHONE ENCOUNTER
I can try rybelsus, it is an oral pill for diabetes that is basically the oral form of ozempic if he is willing to try.

## 2024-07-11 NOTE — TELEPHONE ENCOUNTER
Wil Middleton (Key: [id number])  Rx #: [id number]  Rybelsus 3MG tablets  Message from Plan  The request has been approved. The authorization is effective from 07/12/2024 to 01/11/2025, as long as the member is enrolled in their current health plan. The request was approved as submitted. A written notification letter will follow with additional details.. Authorization Expiration Date: January 11, 2025.

## 2024-09-12 DIAGNOSIS — E11.65 TYPE 2 DIABETES MELLITUS WITH HYPERGLYCEMIA, WITHOUT LONG-TERM CURRENT USE OF INSULIN: ICD-10-CM

## 2024-09-12 DIAGNOSIS — E78.2 MIXED HYPERLIPIDEMIA: ICD-10-CM

## 2024-09-12 RX ORDER — GLIPIZIDE 5 MG/1
5 TABLET ORAL
Qty: 120 TABLET | Refills: 0 | Status: SHIPPED | OUTPATIENT
Start: 2024-09-12

## 2024-09-12 RX ORDER — ATORVASTATIN CALCIUM 20 MG/1
30 TABLET, FILM COATED ORAL DAILY
Qty: 90 TABLET | Refills: 0 | Status: SHIPPED | OUTPATIENT
Start: 2024-09-12

## 2024-09-12 NOTE — TELEPHONE ENCOUNTER
Rx Refill Note  Requested Prescriptions     Pending Prescriptions Disp Refills    atorvastatin (Lipitor) 20 MG tablet 90 tablet 0     Sig: Take 1.5 tablets by mouth Daily.    glipizide (Glucotrol) 5 MG tablet 120 tablet 0     Sig: Take 1 tablet by mouth 2 (Two) Times a Day Before Meals.      Last office visit with prescribing clinician: 7/3/2024   Last telemedicine visit with prescribing clinician: Visit date not found   Next office visit with prescribing clinician: 10/1/2024                         Would you like a call back once the refill request has been completed: [] Yes [] No    If the office needs to give you a call back, can they leave a voicemail: [] Yes [] No    Dian Bentley MA  09/12/24, 11:44 CDT

## 2024-10-01 DIAGNOSIS — E11.65 TYPE 2 DIABETES MELLITUS WITH HYPERGLYCEMIA, WITHOUT LONG-TERM CURRENT USE OF INSULIN: ICD-10-CM

## 2024-10-01 NOTE — TELEPHONE ENCOUNTER
Rx Refill Note  Requested Prescriptions     Pending Prescriptions Disp Refills    metFORMIN (Glucophage) 1000 MG tablet 90 tablet 1     Sig: Take 1 tablet by mouth 2 (Two) Times a Day With Meals.      Last office visit with prescribing clinician: 7/3/2024   Last telemedicine visit with prescribing clinician: Visit date not found   Next office visit with prescribing clinician: 10/2/2024                         Would you like a call back once the refill request has been completed: [] Yes [] No    If the office needs to give you a call back, can they leave a voicemail: [] Yes [] No    Dian Bentley MA  10/01/24, 09:03 CDT

## 2024-10-02 ENCOUNTER — OFFICE VISIT (OUTPATIENT)
Dept: INTERNAL MEDICINE | Facility: CLINIC | Age: 49
End: 2024-10-02
Payer: MEDICAID

## 2024-10-02 VITALS
SYSTOLIC BLOOD PRESSURE: 128 MMHG | BODY MASS INDEX: 36.01 KG/M2 | HEART RATE: 89 BPM | RESPIRATION RATE: 17 BRPM | HEIGHT: 71 IN | DIASTOLIC BLOOD PRESSURE: 83 MMHG | WEIGHT: 257.2 LBS | OXYGEN SATURATION: 98 %

## 2024-10-02 DIAGNOSIS — E78.2 MIXED HYPERLIPIDEMIA: ICD-10-CM

## 2024-10-02 DIAGNOSIS — E11.65 TYPE 2 DIABETES MELLITUS WITH HYPERGLYCEMIA, WITHOUT LONG-TERM CURRENT USE OF INSULIN: Primary | ICD-10-CM

## 2024-10-02 DIAGNOSIS — I10 PRIMARY HYPERTENSION: ICD-10-CM

## 2024-10-02 LAB
EXPIRATION DATE: ABNORMAL
HBA1C MFR BLD: 8.1 % (ref 4.5–5.7)
Lab: ABNORMAL

## 2024-10-02 PROCEDURE — 99214 OFFICE O/P EST MOD 30 MIN: CPT

## 2024-10-02 PROCEDURE — 83036 HEMOGLOBIN GLYCOSYLATED A1C: CPT

## 2024-10-02 PROCEDURE — 1125F AMNT PAIN NOTED PAIN PRSNT: CPT

## 2024-10-02 PROCEDURE — 3052F HG A1C>EQUAL 8.0%<EQUAL 9.0%: CPT

## 2024-10-02 PROCEDURE — 1160F RVW MEDS BY RX/DR IN RCRD: CPT

## 2024-10-02 PROCEDURE — 1159F MED LIST DOCD IN RCRD: CPT

## 2024-10-02 NOTE — PROGRESS NOTES
Subjective     Chief Complaint   Patient presents with    Diabetes     3 month follow up.        Diabetes  Pertinent negatives for hypoglycemia include no headaches or nervousness/anxiousness. Pertinent negatives for diabetes include no chest pain and no fatigue.     History of Present Illness  The patient presents for evaluation of multiple medical concerns.    He has been consuming a significant amount of broccoli, prepared with ham, cheese, banana peppers, sweet pickles, dill pickles, green olives, and Max dressing. His current medication regimen includes Rybelsus 3 mg, metformin twice daily, and glipizide twice daily. Does not check sugar regularly. Admits to compliance with all medications    He is also taking vitamin D in liquid form.     Patient's PMR from outside medical facility reviewed and noted.    Review of Systems   Constitutional:  Negative for fatigue.   HENT:  Negative for congestion.    Respiratory:  Negative for cough and shortness of breath.    Cardiovascular:  Negative for chest pain.   Gastrointestinal:  Negative for abdominal pain, diarrhea, nausea and vomiting.   Genitourinary:  Negative for difficulty urinating.   Musculoskeletal:  Negative for myalgias.   Neurological:  Negative for headaches.   Psychiatric/Behavioral:  The patient is not nervous/anxious.         Otherwise complete ROS reviewed and negative except as mentioned in the HPI.    Past Medical History:   Past Medical History:   Diagnosis Date    Hypertension     Skin cancer      Past Surgical History:History reviewed. No pertinent surgical history.  Social History:  reports that he quit smoking about 24 years ago. His smoking use included cigarettes. He started smoking about 33 years ago. He has a 9 pack-year smoking history. He quit smokeless tobacco use about 19 years ago. He reports current drug use. Drug: Marijuana. He reports that he does not drink alcohol.    Family History: family history includes Diabetes in his  mother; Hypertension in his mother.      Allergies:  Allergies   Allergen Reactions    Lisinopril Other (See Comments)     hypotension     Medications:  Prior to Admission medications    Medication Sig Start Date End Date Taking? Authorizing Provider   amLODIPine (NORVASC) 5 MG tablet Take 1 tablet by mouth Daily. 7/3/24  Yes Kiara Rouse APRN   atorvastatin (Lipitor) 20 MG tablet Take 1.5 tablets by mouth Daily. 9/12/24  Yes Kiara Rouse APRN   Blood Glucose Monitoring Suppl ([facility] Blood Glucose Monitor) device 1 each Daily As Needed (glucose monitoring). 9/30/22  Yes Kiara Rouse APRN   Blood Glucose Monitoring Suppl (FreeStyle Freedom Lite) w/Device kit TEST ONCE A DAY AS NEEDED (GLUCOSE MONITORING). 9/30/22  Yes Provider, MD Pavel   diclofenac (VOLTAREN) 50 MG EC tablet Take 1 tablet by mouth 2 (Two) Times a Day As Needed (pain). 8/9/22  Yes Kiara Rouse APRN   fluticasone (FLONASE) 50 MCG/ACT nasal spray 1 spray into the nostril(s) as directed by provider Daily. 3/3/23  Yes Kiara Rouse APRN   glipizide (Glucotrol) 5 MG tablet Take 1 tablet by mouth 2 (Two) Times a Day Before Meals. 9/12/24  Yes Kiara Rouse APRN   glucose blood test strip Use as instructed 7/1/22  Yes Kiara Rouse APRN   metFORMIN (Glucophage) 1000 MG tablet Take 1 tablet by mouth 2 (Two) Times a Day With Meals. 10/1/24  Yes Kiara Rouse APRN   Semaglutide (Rybelsus) 3 MG tablet Take 1 tablet by mouth Daily. 7/11/24  Yes Kiara Rouse APRN   valsartan-hydrochlorothiazide (DIOVAN-HCT) 80-12.5 MG per tablet Take 1 tablet by mouth Daily. 7/3/24  Yes Kiara Rouse APRN   vitamin D (ERGOCALCIFEROL) 1.25 MG (25674 UT) capsule capsule Take 1 capsule by mouth 1 (One) Time Per Week. 7/9/24  Yes Kiara Rouse APRN   metFORMIN (Glucophage) 1000 MG tablet Take 1 tablet by mouth 2 (Two) Times a Day With Meals. 7/3/24 10/1/24  Kiara Rouse APRN       AURELIO:        PHQ-9 Depression  "Screening  Little interest or pleasure in doing things?     Feeling down, depressed, or hopeless?     Trouble falling or staying asleep, or sleeping too much?     Feeling tired or having little energy?     Poor appetite or overeating?     Feeling bad about yourself - or that you are a failure or have let yourself or your family down?     Trouble concentrating on things, such as reading the newspaper or watching television?     Moving or speaking so slowly that other people could have noticed? Or the opposite - being so fidgety or restless that you have been moving around a lot more than usual?     Thoughts that you would be better off dead, or of hurting yourself in some way?     PHQ-9 Total Score     If you checked off any problems, how difficult have these problems made it for you to do your work, take care of things at home, or get along with other people?         =    Objective     Vital Signs: /83 (BP Location: Left arm, Patient Position: Sitting, Cuff Size: Large Adult)   Pulse 89   Resp 17   Ht 180.3 cm (71\")   Wt 117 kg (257 lb 3.2 oz)   SpO2 98%   BMI 35.87 kg/m²   Physical Exam  Vitals and nursing note reviewed.   Constitutional:       General: He is not in acute distress.     Appearance: Normal appearance. He is not ill-appearing.   HENT:      Head: Normocephalic and atraumatic.      Right Ear: External ear normal.      Left Ear: External ear normal.      Nose: Nose normal.      Mouth/Throat:      Mouth: Mucous membranes are moist.      Pharynx: No posterior oropharyngeal erythema.   Eyes:      General: No scleral icterus.     Extraocular Movements: Extraocular movements intact.      Conjunctiva/sclera: Conjunctivae normal.      Pupils: Pupils are equal, round, and reactive to light.   Cardiovascular:      Rate and Rhythm: Normal rate and regular rhythm.      Pulses: Normal pulses.      Heart sounds: Normal heart sounds.   Pulmonary:      Effort: Pulmonary effort is normal. No respiratory " distress.      Breath sounds: Normal breath sounds. No wheezing.   Abdominal:      General: Abdomen is flat. Bowel sounds are normal.      Palpations: Abdomen is soft.      Tenderness: There is no abdominal tenderness.   Musculoskeletal:         General: Normal range of motion.      Cervical back: Normal range of motion.      Right lower leg: No edema.      Left lower leg: No edema.   Skin:     General: Skin is warm and dry.      Findings: No erythema or rash.   Neurological:      General: No focal deficit present.      Mental Status: He is alert and oriented to person, place, and time. Mental status is at baseline.      Motor: No weakness.   Psychiatric:         Mood and Affect: Mood normal.         Behavior: Behavior normal.         Thought Content: Thought content normal.         Judgment: Judgment normal.                Advance Care Planning   ACP discussion was held with the patient during this visit.         Results Reviewed:  Glucose   Date Value Ref Range Status   07/03/2024 220 (H) 70 - 99 mg/dL Final   02/11/2019 139 (H) 74 - 109 mg/dL Final     BUN   Date Value Ref Range Status   07/03/2024 18 6 - 24 mg/dL Final   02/11/2019 15 6 - 20 mg/dL Final     Creatinine   Date Value Ref Range Status   07/03/2024 0.91 0.76 - 1.27 mg/dL Final   02/11/2019 0.8 0.5 - 1.2 mg/dL Final     Sodium   Date Value Ref Range Status   07/03/2024 136 134 - 144 mmol/L Final   02/11/2019 139 136 - 145 mmol/L Final     Potassium   Date Value Ref Range Status   07/03/2024 4.4 3.5 - 5.2 mmol/L Final   02/11/2019 4.1 3.5 - 5.0 mmol/L Final     Chloride   Date Value Ref Range Status   07/03/2024 97 96 - 106 mmol/L Final   02/11/2019 101 98 - 111 mmol/L Final     CO2   Date Value Ref Range Status   02/11/2019 27 22 - 29 mmol/L Final     Total CO2   Date Value Ref Range Status   07/03/2024 24 20 - 29 mmol/L Final     Calcium   Date Value Ref Range Status   07/03/2024 9.4 8.7 - 10.2 mg/dL Final   02/11/2019 8.7 8.6 - 10.0 mg/dL Final      ALT (SGPT)   Date Value Ref Range Status   07/03/2024 39 0 - 44 IU/L Final     AST (SGOT)   Date Value Ref Range Status   07/03/2024 23 0 - 40 IU/L Final     WBC   Date Value Ref Range Status   07/03/2024 8.6 3.4 - 10.8 x10E3/uL Final   02/11/2019 7.0 4.8 - 10.8 K/uL Final     Hematocrit   Date Value Ref Range Status   07/03/2024 43.9 37.5 - 51.0 % Final   02/11/2019 44.6 42.0 - 52.0 % Final     Platelets   Date Value Ref Range Status   07/03/2024 297 150 - 450 x10E3/uL Final   02/11/2019 300 130 - 400 K/uL Final     Triglycerides   Date Value Ref Range Status   07/03/2024 315 (H) 0 - 149 mg/dL Final     HDL Cholesterol   Date Value Ref Range Status   07/03/2024 26 (L) >39 mg/dL Final     LDL Chol Calc (NIH)   Date Value Ref Range Status   07/03/2024 62 0 - 99 mg/dL Final     LDL/HDL Ratio   Date Value Ref Range Status   09/28/2014 5.6 (H) 0.0 - 3.4 Final     Hemoglobin A1C   Date Value Ref Range Status   10/02/2024 8.1 (A) 4.5 - 5.7 % Final         Assessment / Plan     Assessment/Plan:    1. Type 2 diabetes mellitus with hyperglycemia, without long-term current use of insulin  - POC Glycosylated Hemoglobin (Hb A1C)    2. Mixed hyperlipidemia  - Lipid panel    3. Primary hypertension  - CBC w AUTO Differential  - Comprehensive metabolic panel    Diagnoses and all orders for this visit:    1. Type 2 diabetes mellitus with hyperglycemia, without long-term current use of insulin (Primary)  -     POC Glycosylated Hemoglobin (Hb A1C)    2. Mixed hyperlipidemia  -     Lipid panel    3. Primary hypertension  -     CBC w AUTO Differential  -     Comprehensive metabolic panel        Assessment & Plan  1. Diabetes Mellitus.  There has been a slight improvement in his A1c levels, decreasing from 8.3 to 8.1. He was advised to incorporate sugar-free alternatives into his diet. He will provide an update on his current medications tomorrow, after which necessary adjustments will be made. He is currently taking Rybelsus  (semaglutide) 3 mg, Metformin twice a day, and glipizide twice a day.    2. Hypertension.  His blood pressure readings are within the normal range. He is currently taking hydrochlorothiazide with valsartan and amlodipine.  3. Hyperlipidemia.  A blood sample will be taken today to assess his cholesterol levels. He is currently taking atorvastatin (Lipitor).      No follow-ups on file. unless patient needs to be seen sooner or acute issues arise.      I have discussed the patient results/orders and and plan/recommendation with them at today's visit.    Patient or patient representative verbalized consent for the use of Ambient Listening during the visit with  KWAME Anderson for chart documentation. 10/2/2024  11:54 CDT  KWAME Anderson   10/02/2024

## 2024-10-03 ENCOUNTER — TELEPHONE (OUTPATIENT)
Dept: INTERNAL MEDICINE | Facility: CLINIC | Age: 49
End: 2024-10-03

## 2024-10-03 LAB
ALBUMIN SERPL-MCNC: 4.4 G/DL (ref 4.1–5.1)
ALP SERPL-CCNC: 119 IU/L (ref 44–121)
ALT SERPL-CCNC: 43 IU/L (ref 0–44)
AST SERPL-CCNC: 30 IU/L (ref 0–40)
BASOPHILS # BLD AUTO: 0.1 X10E3/UL (ref 0–0.2)
BASOPHILS NFR BLD AUTO: 1 %
BILIRUB SERPL-MCNC: 0.4 MG/DL (ref 0–1.2)
BUN SERPL-MCNC: 16 MG/DL (ref 6–24)
BUN/CREAT SERPL: 16 (ref 9–20)
CALCIUM SERPL-MCNC: 10 MG/DL (ref 8.7–10.2)
CHLORIDE SERPL-SCNC: 99 MMOL/L (ref 96–106)
CHOLEST SERPL-MCNC: 143 MG/DL (ref 100–199)
CO2 SERPL-SCNC: 24 MMOL/L (ref 20–29)
CREAT SERPL-MCNC: 0.98 MG/DL (ref 0.76–1.27)
EGFRCR SERPLBLD CKD-EPI 2021: 95 ML/MIN/1.73
EOSINOPHIL # BLD AUTO: 0.2 X10E3/UL (ref 0–0.4)
EOSINOPHIL NFR BLD AUTO: 2 %
ERYTHROCYTE [DISTWIDTH] IN BLOOD BY AUTOMATED COUNT: 13.1 % (ref 11.6–15.4)
GLOBULIN SER CALC-MCNC: 3.2 G/DL (ref 1.5–4.5)
GLUCOSE SERPL-MCNC: 141 MG/DL (ref 70–99)
HCT VFR BLD AUTO: 45.7 % (ref 37.5–51)
HDLC SERPL-MCNC: 30 MG/DL
HGB BLD-MCNC: 15.1 G/DL (ref 13–17.7)
IMM GRANULOCYTES # BLD AUTO: 0 X10E3/UL (ref 0–0.1)
IMM GRANULOCYTES NFR BLD AUTO: 0 %
LDLC SERPL CALC-MCNC: 78 MG/DL (ref 0–99)
LYMPHOCYTES # BLD AUTO: 1.9 X10E3/UL (ref 0.7–3.1)
LYMPHOCYTES NFR BLD AUTO: 25 %
MCH RBC QN AUTO: 30 PG (ref 26.6–33)
MCHC RBC AUTO-ENTMCNC: 33 G/DL (ref 31.5–35.7)
MCV RBC AUTO: 91 FL (ref 79–97)
MONOCYTES # BLD AUTO: 0.7 X10E3/UL (ref 0.1–0.9)
MONOCYTES NFR BLD AUTO: 9 %
NEUTROPHILS # BLD AUTO: 4.7 X10E3/UL (ref 1.4–7)
NEUTROPHILS NFR BLD AUTO: 63 %
PLATELET # BLD AUTO: 337 X10E3/UL (ref 150–450)
POTASSIUM SERPL-SCNC: 4.6 MMOL/L (ref 3.5–5.2)
PROT SERPL-MCNC: 7.6 G/DL (ref 6–8.5)
RBC # BLD AUTO: 5.04 X10E6/UL (ref 4.14–5.8)
SODIUM SERPL-SCNC: 137 MMOL/L (ref 134–144)
TRIGL SERPL-MCNC: 207 MG/DL (ref 0–149)
VLDLC SERPL CALC-MCNC: 35 MG/DL (ref 5–40)
WBC # BLD AUTO: 7.6 X10E3/UL (ref 3.4–10.8)

## 2024-10-03 NOTE — TELEPHONE ENCOUNTER
Returned call and spoke to patient about lab results. He voiced understanding and states he is taking all of the medication as listed on his med list.

## 2024-10-03 NOTE — TELEPHONE ENCOUNTER
Hub staff attempted to follow warm transfer process and was unsuccessful     Caller: Wil Middleton    Relationship to patient: Self    Best call back number: [phone]     Patient is needing: RETURNING MISSED CALL BACK LIKELY AND RESULTS

## 2024-10-08 DIAGNOSIS — I10 PRIMARY HYPERTENSION: ICD-10-CM

## 2024-10-08 RX ORDER — AMLODIPINE BESYLATE 5 MG/1
5 TABLET ORAL DAILY
Qty: 90 TABLET | Refills: 1 | Status: SHIPPED | OUTPATIENT
Start: 2024-10-08

## 2024-10-08 RX ORDER — VALSARTAN AND HYDROCHLOROTHIAZIDE 80; 12.5 MG/1; MG/1
1 TABLET, FILM COATED ORAL DAILY
Qty: 90 TABLET | Refills: 1 | Status: SHIPPED | OUTPATIENT
Start: 2024-10-08

## 2024-10-08 NOTE — TELEPHONE ENCOUNTER
Rx Refill Note  Requested Prescriptions     Pending Prescriptions Disp Refills    valsartan-hydrochlorothiazide (DIOVAN-HCT) 80-12.5 MG per tablet 90 tablet 1     Sig: Take 1 tablet by mouth Daily.      Last office visit with prescribing clinician: 10/2/2024   Last telemedicine visit with prescribing clinician: Visit date not found   Next office visit with prescribing clinician: 1/2/2025                         Would you like a call back once the refill request has been completed: [] Yes [] No    If the office needs to give you a call back, can they leave a voicemail: [] Yes [] No    Anusha Meza RN  10/08/24, 14:39 CDT

## 2024-10-08 NOTE — TELEPHONE ENCOUNTER
Rx Refill Note  Requested Prescriptions     Pending Prescriptions Disp Refills    amLODIPine (NORVASC) 5 MG tablet 90 tablet 1     Sig: Take 1 tablet by mouth Daily.      Last office visit with prescribing clinician: 10/2/2024   Last telemedicine visit with prescribing clinician: Visit date not found   Next office visit with prescribing clinician: 1/2/2025                         Would you like a call back once the refill request has been completed: [] Yes [] No    If the office needs to give you a call back, can they leave a voicemail: [] Yes [] No    Anusha Meza RN  10/08/24, 14:13 CDT

## 2024-11-12 DIAGNOSIS — E78.2 MIXED HYPERLIPIDEMIA: ICD-10-CM

## 2024-11-12 DIAGNOSIS — E11.65 TYPE 2 DIABETES MELLITUS WITH HYPERGLYCEMIA, WITHOUT LONG-TERM CURRENT USE OF INSULIN: ICD-10-CM

## 2024-11-12 RX ORDER — ATORVASTATIN CALCIUM 20 MG/1
30 TABLET, FILM COATED ORAL DAILY
Qty: 90 TABLET | Refills: 3 | Status: SHIPPED | OUTPATIENT
Start: 2024-11-12

## 2024-11-12 RX ORDER — GLIPIZIDE 5 MG/1
5 TABLET ORAL
Qty: 180 TABLET | Refills: 3 | Status: SHIPPED | OUTPATIENT
Start: 2024-11-12

## 2024-11-12 NOTE — TELEPHONE ENCOUNTER
Rx Refill Note  Requested Prescriptions     Pending Prescriptions Disp Refills    glipizide (Glucotrol) 5 MG tablet 120 tablet 0     Sig: Take 1 tablet by mouth 2 (Two) Times a Day Before Meals.    atorvastatin (Lipitor) 20 MG tablet 90 tablet 0     Sig: Take 1.5 tablets by mouth Daily.      Last office visit with prescribing clinician: 10/2/2024   Last telemedicine visit with prescribing clinician: Visit date not found   Next office visit with prescribing clinician: 1/2/2025                         Would you like a call back once the refill request has been completed: [] Yes [] No    If the office needs to give you a call back, can they leave a voicemail: [] Yes [] No    Milly Rowley MA  11/12/24, 12:07 CST

## 2024-12-31 DIAGNOSIS — E11.65 TYPE 2 DIABETES MELLITUS WITH HYPERGLYCEMIA, WITHOUT LONG-TERM CURRENT USE OF INSULIN: ICD-10-CM

## 2024-12-31 NOTE — TELEPHONE ENCOUNTER
Rx Refill Note  Requested Prescriptions     Pending Prescriptions Disp Refills    metFORMIN (Glucophage) 1000 MG tablet 90 tablet 1     Sig: Take 1 tablet by mouth 2 (Two) Times a Day With Meals.      Last office visit with prescribing clinician: 10/2/2024   Last telemedicine visit with prescribing clinician: Visit date not found   Next office visit with prescribing clinician: 1/2/2025                         Would you like a call back once the refill request has been completed: [] Yes [] No    If the office needs to give you a call back, can they leave a voicemail: [] Yes [] No    Dian Bentley MA  12/31/24, 08:53 CST

## 2025-01-02 ENCOUNTER — OFFICE VISIT (OUTPATIENT)
Dept: INTERNAL MEDICINE | Facility: CLINIC | Age: 50
End: 2025-01-02
Payer: MEDICAID

## 2025-01-02 VITALS
BODY MASS INDEX: 36.51 KG/M2 | RESPIRATION RATE: 18 BRPM | HEART RATE: 85 BPM | DIASTOLIC BLOOD PRESSURE: 86 MMHG | WEIGHT: 260.8 LBS | HEIGHT: 71 IN | SYSTOLIC BLOOD PRESSURE: 132 MMHG | OXYGEN SATURATION: 97 %

## 2025-01-02 DIAGNOSIS — I10 PRIMARY HYPERTENSION: ICD-10-CM

## 2025-01-02 DIAGNOSIS — E11.65 TYPE 2 DIABETES MELLITUS WITH HYPERGLYCEMIA, WITHOUT LONG-TERM CURRENT USE OF INSULIN: ICD-10-CM

## 2025-01-02 DIAGNOSIS — Z12.11 SCREENING FOR MALIGNANT NEOPLASM OF COLON: Primary | ICD-10-CM

## 2025-01-02 DIAGNOSIS — E78.2 MIXED HYPERLIPIDEMIA: ICD-10-CM

## 2025-01-02 PROCEDURE — 99214 OFFICE O/P EST MOD 30 MIN: CPT

## 2025-01-02 PROCEDURE — 3046F HEMOGLOBIN A1C LEVEL >9.0%: CPT

## 2025-01-02 PROCEDURE — 1159F MED LIST DOCD IN RCRD: CPT

## 2025-01-02 PROCEDURE — 1125F AMNT PAIN NOTED PAIN PRSNT: CPT

## 2025-01-02 PROCEDURE — 1160F RVW MEDS BY RX/DR IN RCRD: CPT

## 2025-01-02 NOTE — PROGRESS NOTES
Subjective     Chief Complaint   Patient presents with    Diabetes     3 month follow up.        Diabetes    History of Present Illness  The patient presents for evaluation of diabetes, hypertension, and health maintenance.    He has not been monitoring his blood glucose levels at home recently, with the last check being approximately 2 weeks ago. He reports no recent episodes of chest pain or shortness of breath. He continues to take glipizide and metformin.    He acknowledges elevated blood pressure readings. He missed his antihypertensive medication yesterday but has taken it today. He is on amlodipine and valsartan/hydrochlorothiazide.    He is due for a colonoscopy. He declines pneumonia and shingles vaccines. He had an eye examination approximately 4 to 5 months ago. He reports no presence of blood in his stool or urine. He is on Lipitor 20 mg and takes vitamin D supplements weekly.    SOCIAL HISTORY  He has 2 children, a son aged 31 and a daughter who turned 18 today. He also has 3 grandchildren. He has 4 dogs.    MEDICATIONS  Lipitor 20 mg, glipizide, metformin, amlodipine, valsartan/hydrochlorothiazide, vitamin D    IMMUNIZATIONS  He declines pneumonia and shingles vaccines.    Patient's PMR from outside medical facility reviewed and noted.    Review of Systems     Otherwise complete ROS reviewed and negative except as mentioned in the HPI.    Past Medical History:   Past Medical History:   Diagnosis Date    Hypertension     Skin cancer      Past Surgical History:History reviewed. No pertinent surgical history.  Social History:  reports that he quit smoking about 25 years ago. His smoking use included cigarettes. He started smoking about 34 years ago. He has a 9 pack-year smoking history. He quit smokeless tobacco use about 20 years ago. He reports current drug use. Drug: Marijuana. He reports that he does not drink alcohol.    Family History: family history includes Diabetes in his mother;  Hypertension in his mother.      Allergies:  Allergies   Allergen Reactions    Lisinopril Other (See Comments)     hypotension     Medications:  Prior to Admission medications    Medication Sig Start Date End Date Taking? Authorizing Provider   amLODIPine (NORVASC) 5 MG tablet Take 1 tablet by mouth Daily. 10/8/24  Yes Kiara Rouse APRN   atorvastatin (Lipitor) 20 MG tablet Take 1.5 tablets by mouth Daily. 11/12/24  Yes Kiara Rouse APRN   Blood Glucose Monitoring Suppl ([facility] Blood Glucose Monitor) device 1 each Daily As Needed (glucose monitoring). 9/30/22  Yes Kiara Rouse APRN   Blood Glucose Monitoring Suppl (FreeStyle Freedom Lite) w/Device kit TEST ONCE A DAY AS NEEDED (GLUCOSE MONITORING). 9/30/22  Yes Provider, MD Pavel   diclofenac (VOLTAREN) 50 MG EC tablet Take 1 tablet by mouth 2 (Two) Times a Day As Needed (pain). 8/9/22  Yes Kiara Rouse APRN   fluticasone (FLONASE) 50 MCG/ACT nasal spray 1 spray into the nostril(s) as directed by provider Daily. 3/3/23  Yes Kiara Rouse APRN   glipizide (Glucotrol) 5 MG tablet Take 1 tablet by mouth 2 (Two) Times a Day Before Meals. 11/12/24  Yes Kiara Rouse APRN   glucose blood test strip Use as instructed 7/1/22  Yes Kiara Rouse APRN   metFORMIN (Glucophage) 1000 MG tablet Take 1 tablet by mouth 2 (Two) Times a Day With Meals. 12/31/24  Yes Kiara Rouse APRN   valsartan-hydrochlorothiazide (DIOVAN-HCT) 80-12.5 MG per tablet Take 1 tablet by mouth Daily. 10/8/24  Yes Kiara Rouse APRN   vitamin D (ERGOCALCIFEROL) 1.25 MG (31456 UT) capsule capsule Take 1 capsule by mouth 1 (One) Time Per Week. 7/9/24  Yes Kiara Rouse APRN       AURELIO:        PHQ-9 Depression Screening  Little interest or pleasure in doing things? Not at all   Feeling down, depressed, or hopeless? Not at all   PHQ-2 Total Score 0   Trouble falling or staying asleep, or sleeping too much?     Feeling tired or having little energy?     Poor  "appetite or overeating?     Feeling bad about yourself - or that you are a failure or have let yourself or your family down?     Trouble concentrating on things, such as reading the newspaper or watching television?     Moving or speaking so slowly that other people could have noticed? Or the opposite - being so fidgety or restless that you have been moving around a lot more than usual?     Thoughts that you would be better off dead, or of hurting yourself in some way?     PHQ-9 Total Score     If you checked off any problems, how difficult have these problems made it for you to do your work, take care of things at home, or get along with other people? Not difficult at all         Objective     Vital Signs: /93 (BP Location: Right arm, Patient Position: Sitting, Cuff Size: Large Adult)   Pulse 85   Resp 18   Ht 180.3 cm (71\")   Wt 118 kg (260 lb 12.8 oz)   SpO2 97%   BMI 36.37 kg/m²   Physical Exam  Vitals and nursing note reviewed.   Constitutional:       General: He is not in acute distress.     Appearance: Normal appearance. He is not ill-appearing.   HENT:      Head: Normocephalic and atraumatic.      Right Ear: External ear normal.      Left Ear: External ear normal.      Nose: Nose normal.      Mouth/Throat:      Mouth: Mucous membranes are moist.      Pharynx: No posterior oropharyngeal erythema.   Eyes:      General: No scleral icterus.     Extraocular Movements: Extraocular movements intact.      Conjunctiva/sclera: Conjunctivae normal.      Pupils: Pupils are equal, round, and reactive to light.   Cardiovascular:      Rate and Rhythm: Normal rate and regular rhythm.      Pulses: Normal pulses.      Heart sounds: Normal heart sounds.   Pulmonary:      Effort: Pulmonary effort is normal. No respiratory distress.      Breath sounds: Normal breath sounds. No wheezing.   Abdominal:      General: Abdomen is flat. Bowel sounds are normal.      Palpations: Abdomen is soft.      Tenderness: There is no " abdominal tenderness.   Musculoskeletal:         General: Normal range of motion.      Cervical back: Normal range of motion.      Right lower leg: No edema.      Left lower leg: No edema.   Skin:     General: Skin is warm and dry.      Findings: No erythema or rash.   Neurological:      General: No focal deficit present.      Mental Status: He is alert and oriented to person, place, and time. Mental status is at baseline.      Motor: No weakness.   Psychiatric:         Mood and Affect: Mood normal.         Behavior: Behavior normal.         Thought Content: Thought content normal.         Judgment: Judgment normal.              Advance Care Planning   ACP discussion was held with the patient during this visit.         Results Reviewed:  Glucose   Date Value Ref Range Status   10/01/2024 141 (H) 70 - 99 mg/dL Final   02/11/2019 139 (H) 74 - 109 mg/dL Final     BUN   Date Value Ref Range Status   10/01/2024 16 6 - 24 mg/dL Final   02/11/2019 15 6 - 20 mg/dL Final     Creatinine   Date Value Ref Range Status   10/01/2024 0.98 0.76 - 1.27 mg/dL Final   02/11/2019 0.8 0.5 - 1.2 mg/dL Final     Sodium   Date Value Ref Range Status   10/01/2024 137 134 - 144 mmol/L Final   02/11/2019 139 136 - 145 mmol/L Final     Potassium   Date Value Ref Range Status   10/01/2024 4.6 3.5 - 5.2 mmol/L Final   02/11/2019 4.1 3.5 - 5.0 mmol/L Final     Chloride   Date Value Ref Range Status   10/01/2024 99 96 - 106 mmol/L Final   02/11/2019 101 98 - 111 mmol/L Final     CO2   Date Value Ref Range Status   02/11/2019 27 22 - 29 mmol/L Final     Total CO2   Date Value Ref Range Status   10/01/2024 24 20 - 29 mmol/L Final     Calcium   Date Value Ref Range Status   10/01/2024 10.0 8.7 - 10.2 mg/dL Final   02/11/2019 8.7 8.6 - 10.0 mg/dL Final     ALT (SGPT)   Date Value Ref Range Status   10/01/2024 43 0 - 44 IU/L Final     AST (SGOT)   Date Value Ref Range Status   10/01/2024 30 0 - 40 IU/L Final     WBC   Date Value Ref Range Status    10/01/2024 7.6 3.4 - 10.8 x10E3/uL Final   02/11/2019 7.0 4.8 - 10.8 K/uL Final     Hematocrit   Date Value Ref Range Status   10/01/2024 45.7 37.5 - 51.0 % Final   02/11/2019 44.6 42.0 - 52.0 % Final     Platelets   Date Value Ref Range Status   10/01/2024 337 150 - 450 x10E3/uL Final   02/11/2019 300 130 - 400 K/uL Final     Triglycerides   Date Value Ref Range Status   10/01/2024 207 (H) 0 - 149 mg/dL Final     HDL Cholesterol   Date Value Ref Range Status   10/01/2024 30 (L) >39 mg/dL Final     LDL Chol Calc (NIH)   Date Value Ref Range Status   10/01/2024 78 0 - 99 mg/dL Final     LDL/HDL Ratio   Date Value Ref Range Status   09/28/2014 5.6 (H) 0.0 - 3.4 Final     Hemoglobin A1C   Date Value Ref Range Status   10/02/2024 8.1 (A) 4.5 - 5.7 % Final         Assessment / Plan     Assessment/Plan:    1. Screening for malignant neoplasm of colon  - Ambulatory Referral For Screening Colonoscopy    2. Type 2 diabetes mellitus with hyperglycemia, without long-term current use of insulin  - Hemoglobin A1c    3. Mixed hyperlipidemia  - Lipid panel    4. Primary hypertension  - CBC w AUTO Differential  - Comprehensive metabolic panel    Diagnoses and all orders for this visit:    1. Screening for malignant neoplasm of colon (Primary)  -     Ambulatory Referral For Screening Colonoscopy    2. Type 2 diabetes mellitus with hyperglycemia, without long-term current use of insulin  -     Hemoglobin A1c    3. Mixed hyperlipidemia  -     Lipid panel    4. Primary hypertension  -     CBC w AUTO Differential  -     Comprehensive metabolic panel        Assessment & Plan  1. Diabetes mellitus.  He has not been checking his blood sugar regularly, with the last check being about 2 weeks ago. He is currently taking glipizide and metformin. He is advised to resume regular blood sugar monitoring. Laboratory tests will be conducted to assess his current diabetes markers. If the diabetes marker remains high, adjustments to his medication  regimen may be necessary.    2. Hypertension.  His blood pressure is elevated today, likely due to missing his medication yesterday. He is currently taking amlodipine and valsartan/hydrochlorothiazide. His blood pressure will be rechecked during this visit.    3. Health maintenance.  He is due for a colonoscopy, which will be scheduled at [facility]. He declines the pneumonia and shingles vaccines. He had an eye exam approximately 4-5 months ago. Laboratory tests will be conducted to assess his overall health status.    Return in about 3 months (around 4/2/2025) for Recheck. unless patient needs to be seen sooner or acute issues arise.      I have discussed the patient results/orders and and plan/recommendation with them at today's visit.    Patient or patient representative verbalized consent for the use of Ambient Listening during the visit with  KWAME Anderson for chart documentation. 1/3/2025  16:20 CST  KWAME Anderson   01/02/2025

## 2025-01-03 LAB
ALBUMIN SERPL-MCNC: 4.1 G/DL (ref 4.1–5.1)
ALP SERPL-CCNC: 128 IU/L (ref 44–121)
ALT SERPL-CCNC: 62 IU/L (ref 0–44)
AST SERPL-CCNC: 30 IU/L (ref 0–40)
BASOPHILS # BLD AUTO: 0.1 X10E3/UL (ref 0–0.2)
BASOPHILS NFR BLD AUTO: 1 %
BILIRUB SERPL-MCNC: <0.2 MG/DL (ref 0–1.2)
BUN SERPL-MCNC: 14 MG/DL (ref 6–24)
BUN/CREAT SERPL: 18 (ref 9–20)
CALCIUM SERPL-MCNC: 9.3 MG/DL (ref 8.7–10.2)
CHLORIDE SERPL-SCNC: 100 MMOL/L (ref 96–106)
CHOLEST SERPL-MCNC: 131 MG/DL (ref 100–199)
CO2 SERPL-SCNC: 25 MMOL/L (ref 20–29)
CREAT SERPL-MCNC: 0.8 MG/DL (ref 0.76–1.27)
EGFRCR SERPLBLD CKD-EPI 2021: 108 ML/MIN/1.73
EOSINOPHIL # BLD AUTO: 0.3 X10E3/UL (ref 0–0.4)
EOSINOPHIL NFR BLD AUTO: 4 %
ERYTHROCYTE [DISTWIDTH] IN BLOOD BY AUTOMATED COUNT: 12.6 % (ref 11.6–15.4)
GLOBULIN SER CALC-MCNC: 2.9 G/DL (ref 1.5–4.5)
GLUCOSE SERPL-MCNC: 176 MG/DL (ref 70–99)
HBA1C MFR BLD: 9.3 % (ref 4.8–5.6)
HCT VFR BLD AUTO: 44.3 % (ref 37.5–51)
HDLC SERPL-MCNC: 26 MG/DL
HGB BLD-MCNC: 14.4 G/DL (ref 13–17.7)
IMM GRANULOCYTES # BLD AUTO: 0 X10E3/UL (ref 0–0.1)
IMM GRANULOCYTES NFR BLD AUTO: 1 %
LDLC SERPL CALC-MCNC: 78 MG/DL (ref 0–99)
LYMPHOCYTES # BLD AUTO: 1.6 X10E3/UL (ref 0.7–3.1)
LYMPHOCYTES NFR BLD AUTO: 27 %
MCH RBC QN AUTO: 29.2 PG (ref 26.6–33)
MCHC RBC AUTO-ENTMCNC: 32.5 G/DL (ref 31.5–35.7)
MCV RBC AUTO: 90 FL (ref 79–97)
MONOCYTES # BLD AUTO: 0.6 X10E3/UL (ref 0.1–0.9)
MONOCYTES NFR BLD AUTO: 10 %
NEUTROPHILS # BLD AUTO: 3.3 X10E3/UL (ref 1.4–7)
NEUTROPHILS NFR BLD AUTO: 57 %
PLATELET # BLD AUTO: 346 X10E3/UL (ref 150–450)
POTASSIUM SERPL-SCNC: 4.6 MMOL/L (ref 3.5–5.2)
PROT SERPL-MCNC: 7 G/DL (ref 6–8.5)
RBC # BLD AUTO: 4.93 X10E6/UL (ref 4.14–5.8)
SODIUM SERPL-SCNC: 140 MMOL/L (ref 134–144)
TRIGL SERPL-MCNC: 156 MG/DL (ref 0–149)
VLDLC SERPL CALC-MCNC: 27 MG/DL (ref 5–40)
WBC # BLD AUTO: 5.9 X10E3/UL (ref 3.4–10.8)

## 2025-01-07 ENCOUNTER — TELEPHONE (OUTPATIENT)
Dept: INTERNAL MEDICINE | Facility: CLINIC | Age: 50
End: 2025-01-07
Payer: MEDICAID

## 2025-01-07 NOTE — TELEPHONE ENCOUNTER
Caller: Wil Middleton    Relationship: Self    Best call back number: [phone]     What is the best time to reach you: ANYTIME    Who are you requesting to speak with (clinical staff, provider,  specific staff member): CLINICAL    What was the call regarding: RETURNING A CALL TO TIFFANY ESTRADA    Is it okay if the provider responds through MyChart: NO

## 2025-01-08 ENCOUNTER — TELEPHONE (OUTPATIENT)
Dept: INTERNAL MEDICINE | Facility: CLINIC | Age: 50
End: 2025-01-08
Payer: MEDICAID

## 2025-01-08 NOTE — TELEPHONE ENCOUNTER
Called patient discussed labs.  Advised of significant increase in A1c.  We discussed Ozempic, patient hesitant as he hates needles, I advised him that if A1c continues to increase he will have to be on insulin which is even more needles.  Patient states he would like to have 1 month to work on controlling carbs and sugars himself and will recheck A1c in 1 month if not improved is agreeable to Ozempic at that time.  Discussed risk and benefits.

## 2025-02-13 ENCOUNTER — OFFICE VISIT (OUTPATIENT)
Dept: INTERNAL MEDICINE | Facility: CLINIC | Age: 50
End: 2025-02-13
Payer: MEDICAID

## 2025-02-13 VITALS
WEIGHT: 253 LBS | OXYGEN SATURATION: 99 % | BODY MASS INDEX: 35.42 KG/M2 | HEIGHT: 71 IN | HEART RATE: 96 BPM | RESPIRATION RATE: 18 BRPM | SYSTOLIC BLOOD PRESSURE: 126 MMHG | DIASTOLIC BLOOD PRESSURE: 88 MMHG

## 2025-02-13 DIAGNOSIS — E11.65 TYPE 2 DIABETES MELLITUS WITH HYPERGLYCEMIA, WITHOUT LONG-TERM CURRENT USE OF INSULIN: ICD-10-CM

## 2025-02-13 DIAGNOSIS — Z12.11 SCREENING FOR MALIGNANT NEOPLASM OF COLON: ICD-10-CM

## 2025-02-13 DIAGNOSIS — E78.2 MIXED HYPERLIPIDEMIA: Primary | ICD-10-CM

## 2025-02-13 DIAGNOSIS — I10 PRIMARY HYPERTENSION: ICD-10-CM

## 2025-02-13 NOTE — PROGRESS NOTES
Subjective     Chief Complaint   Patient presents with    Diabetes       Diabetes      History of Present Illness  The patient presents for evaluation of diabetes mellitus, hypercholesterolemia, and health maintenance.    He has not been monitoring his blood glucose levels at home but has been making efforts to improve his diet and increase his physical activity through walking. He expresses a dislike for needles and is uncertain about the option of weekly injections. He prefers to focus on dietary modifications as a potential solution.    He is currently on medication for cholesterol management and is also taking vitamin D supplements.    He is unsure if he has been contacted regarding his colonoscopy. He reports no history of abnormal colonoscopies, family history of colon cancer, or presence of blood in stool. He has a past medical history of hemorrhoids. He is open to receiving the pneumonia vaccine but declines the shingles vaccine. He has a history of chickenpox at the age of 17, which resulted in significant scarring. He reports no recent episodes of chest pain, shortness of breath, blood in stool, or blood in urine.    MEDICATIONS  Current: Vitamin D.    Patient's PMR from outside medical facility reviewed and noted.    Review of Systems   Gastrointestinal:  Negative for blood in stool, constipation, diarrhea and vomiting.        Otherwise complete ROS reviewed and negative except as mentioned in the HPI.    Past Medical History:   Past Medical History:   Diagnosis Date    Hypertension     Skin cancer      Past Surgical History:History reviewed. No pertinent surgical history.  Social History:  reports that he quit smoking about 25 years ago. His smoking use included cigarettes. He started smoking about 34 years ago. He has a 9 pack-year smoking history. He quit smokeless tobacco use about 20 years ago. He reports current drug use. Drug: Marijuana. He reports that he does not drink alcohol.    Family  History: family history includes Diabetes in his mother; Hypertension in his mother.      Allergies:  Allergies   Allergen Reactions    Lisinopril Other (See Comments)     hypotension     Medications:  Prior to Admission medications    Medication Sig Start Date End Date Taking? Authorizing Provider   amLODIPine (NORVASC) 5 MG tablet Take 1 tablet by mouth Daily. 10/8/24  Yes Kiara Rouse APRN   atorvastatin (Lipitor) 20 MG tablet Take 1.5 tablets by mouth Daily. 11/12/24  Yes Kiara Rouse APRN   Blood Glucose Monitoring Suppl ([facility] Blood Glucose Monitor) device 1 each Daily As Needed (glucose monitoring). 9/30/22  Yes Kiara Rouse APRN   Blood Glucose Monitoring Suppl (FreeStyle Freedom Lite) w/Device kit TEST ONCE A DAY AS NEEDED (GLUCOSE MONITORING). 9/30/22  Yes Provider, MD Pavel   diclofenac (VOLTAREN) 50 MG EC tablet Take 1 tablet by mouth 2 (Two) Times a Day As Needed (pain). 8/9/22  Yes Kiara Rouse APRN   fluticasone (FLONASE) 50 MCG/ACT nasal spray 1 spray into the nostril(s) as directed by provider Daily. 3/3/23  Yes Kiara Rouse APRN   glipizide (Glucotrol) 5 MG tablet Take 1 tablet by mouth 2 (Two) Times a Day Before Meals. 11/12/24  Yes Kiara Rouse APRN   glucose blood test strip Use as instructed 7/1/22  Yes Kiara Rouse APRN   metFORMIN (Glucophage) 1000 MG tablet Take 1 tablet by mouth 2 (Two) Times a Day With Meals. 12/31/24  Yes Kiara Rouse APRN   valsartan-hydrochlorothiazide (DIOVAN-HCT) 80-12.5 MG per tablet Take 1 tablet by mouth Daily. 10/8/24  Yes Kiara Rouse APRN   vitamin D (ERGOCALCIFEROL) 1.25 MG (69836 UT) capsule capsule Take 1 capsule by mouth 1 (One) Time Per Week. 7/9/24  Yes Kiara Rouse APRN       AURELIO:        PHQ-9 Depression Screening  Little interest or pleasure in doing things?     Feeling down, depressed, or hopeless?     PHQ-2 Total Score     Trouble falling or staying asleep, or sleeping too much?     Feeling  "tired or having little energy?     Poor appetite or overeating?     Feeling bad about yourself - or that you are a failure or have let yourself or your family down?     Trouble concentrating on things, such as reading the newspaper or watching television?     Moving or speaking so slowly that other people could have noticed? Or the opposite - being so fidgety or restless that you have been moving around a lot more than usual?     Thoughts that you would be better off dead, or of hurting yourself in some way?     PHQ-9 Total Score     If you checked off any problems, how difficult have these problems made it for you to do your work, take care of things at home, or get along with other people?           Objective     Vital Signs: /88 (BP Location: Right arm, Patient Position: Sitting, Cuff Size: Adult)   Pulse 96   Resp 18   Ht 180.3 cm (71\")   Wt 115 kg (253 lb)   SpO2 99%   BMI 35.29 kg/m²   Physical Exam  Vitals and nursing note reviewed.   Constitutional:       General: He is not in acute distress.     Appearance: Normal appearance. He is not ill-appearing.   HENT:      Head: Normocephalic and atraumatic.      Right Ear: External ear normal.      Left Ear: External ear normal.      Nose: Nose normal.      Mouth/Throat:      Mouth: Mucous membranes are moist.      Pharynx: No posterior oropharyngeal erythema.   Eyes:      General: No scleral icterus.     Extraocular Movements: Extraocular movements intact.      Conjunctiva/sclera: Conjunctivae normal.      Pupils: Pupils are equal, round, and reactive to light.   Cardiovascular:      Rate and Rhythm: Normal rate and regular rhythm.      Pulses: Normal pulses.      Heart sounds: Normal heart sounds.   Pulmonary:      Effort: Pulmonary effort is normal. No respiratory distress.      Breath sounds: Normal breath sounds. No wheezing.   Abdominal:      General: Abdomen is flat. Bowel sounds are normal.      Palpations: Abdomen is soft.      Tenderness: There " is no abdominal tenderness.   Musculoskeletal:         General: Normal range of motion.      Cervical back: Normal range of motion.      Right lower leg: No edema.      Left lower leg: No edema.   Skin:     General: Skin is warm and dry.      Findings: No erythema or rash.   Neurological:      General: No focal deficit present.      Mental Status: He is alert and oriented to person, place, and time. Mental status is at baseline.      Motor: No weakness.   Psychiatric:         Mood and Affect: Mood normal.         Behavior: Behavior normal.         Thought Content: Thought content normal.         Judgment: Judgment normal.                Advance Care Planning   ACP discussion was held with the patient during this visit.         Results Reviewed:  Glucose   Date Value Ref Range Status   01/02/2025 176 (H) 70 - 99 mg/dL Final   02/11/2019 139 (H) 74 - 109 mg/dL Final     BUN   Date Value Ref Range Status   01/02/2025 14 6 - 24 mg/dL Final   02/11/2019 15 6 - 20 mg/dL Final     Creatinine   Date Value Ref Range Status   01/02/2025 0.80 0.76 - 1.27 mg/dL Final   02/11/2019 0.8 0.5 - 1.2 mg/dL Final     Sodium   Date Value Ref Range Status   01/02/2025 140 134 - 144 mmol/L Final   02/11/2019 139 136 - 145 mmol/L Final     Potassium   Date Value Ref Range Status   01/02/2025 4.6 3.5 - 5.2 mmol/L Final   02/11/2019 4.1 3.5 - 5.0 mmol/L Final     Chloride   Date Value Ref Range Status   01/02/2025 100 96 - 106 mmol/L Final   02/11/2019 101 98 - 111 mmol/L Final     CO2   Date Value Ref Range Status   02/11/2019 27 22 - 29 mmol/L Final     Total CO2   Date Value Ref Range Status   01/02/2025 25 20 - 29 mmol/L Final     Calcium   Date Value Ref Range Status   01/02/2025 9.3 8.7 - 10.2 mg/dL Final   02/11/2019 8.7 8.6 - 10.0 mg/dL Final     ALT (SGPT)   Date Value Ref Range Status   01/02/2025 62 (H) 0 - 44 IU/L Final     AST (SGOT)   Date Value Ref Range Status   01/02/2025 30 0 - 40 IU/L Final     WBC   Date Value Ref Range  Status   01/02/2025 5.9 3.4 - 10.8 x10E3/uL Final   02/11/2019 7.0 4.8 - 10.8 K/uL Final     Hematocrit   Date Value Ref Range Status   01/02/2025 44.3 37.5 - 51.0 % Final   02/11/2019 44.6 42.0 - 52.0 % Final     Platelets   Date Value Ref Range Status   01/02/2025 346 150 - 450 x10E3/uL Final   02/11/2019 300 130 - 400 K/uL Final     Triglycerides   Date Value Ref Range Status   01/02/2025 156 (H) 0 - 149 mg/dL Final     HDL Cholesterol   Date Value Ref Range Status   01/02/2025 26 (L) >39 mg/dL Final     LDL Chol Calc (NIH)   Date Value Ref Range Status   01/02/2025 78 0 - 99 mg/dL Final     LDL/HDL Ratio   Date Value Ref Range Status   09/28/2014 5.6 (H) 0.0 - 3.4 Final     Hemoglobin A1C   Date Value Ref Range Status   01/02/2025 9.3 (H) 4.8 - 5.6 % Final     Comment:              Prediabetes: 5.7 - 6.4           Diabetes: >6.4           Glycemic control for adults with diabetes: <7.0     10/02/2024 8.1 (A) 4.5 - 5.7 % Final         Assessment / Plan     Assessment/Plan:    1. Mixed hyperlipidemia  - Lipid panel    2. Primary hypertension  - CBC w AUTO Differential  - Comprehensive metabolic panel    3. Type 2 diabetes mellitus with hyperglycemia, without long-term current use of insulin  - Microalbumin / Creatinine Urine Ratio - Urine, Clean Catch  - Hemoglobin A1c    4. Screening for malignant neoplasm of colon  - Cologuard - Stool, Per Rectum; Future    Diagnoses and all orders for this visit:    1. Mixed hyperlipidemia (Primary)  -     Lipid panel    2. Primary hypertension  -     CBC w AUTO Differential  -     Comprehensive metabolic panel    3. Type 2 diabetes mellitus with hyperglycemia, without long-term current use of insulin  -     Microalbumin / Creatinine Urine Ratio - Urine, Clean Catch  -     Hemoglobin A1c    4. Screening for malignant neoplasm of colon  -     Cologuard - Stool, Per Rectum; Future        Assessment & Plan  1. Diabetes mellitus.  He has not been checking his blood sugars  regularly. He has been trying to improve his diet and has increased his walking duration. The option of a once-a-week injection was discussed, but he is hesitant due to a dislike of needles. He prefers to focus more on his diet. A blood glucose test will be conducted today.    2. Hypercholesterolemia.  He is still taking his cholesterol medication.    3. Health maintenance.  He will be provided with the contact information to schedule his colonoscopy. A Cologuard test will be ordered and mailed to his house. He will receive the pneumonia vaccine today.    No follow-ups on file. unless patient needs to be seen sooner or acute issues arise.      I have discussed the patient results/orders and and plan/recommendation with them at today's visit.    Patient or patient representative verbalized consent for the use of Ambient Listening during the visit with  KWAME Anderson for chart documentation. 2/13/2025  08:30 CST  KWAME Anderson   02/13/2025

## 2025-02-14 ENCOUNTER — PRIOR AUTHORIZATION (OUTPATIENT)
Dept: INTERNAL MEDICINE | Facility: CLINIC | Age: 50
End: 2025-02-14
Payer: MEDICAID

## 2025-02-14 DIAGNOSIS — E11.65 TYPE 2 DIABETES MELLITUS WITH HYPERGLYCEMIA, WITHOUT LONG-TERM CURRENT USE OF INSULIN: Primary | ICD-10-CM

## 2025-02-14 LAB
ALBUMIN SERPL-MCNC: 4.4 G/DL (ref 4.1–5.1)
ALBUMIN/CREAT UR: 17 MG/G CREAT (ref 0–29)
ALP SERPL-CCNC: 127 IU/L (ref 44–121)
ALT SERPL-CCNC: 43 IU/L (ref 0–44)
AST SERPL-CCNC: 23 IU/L (ref 0–40)
BASOPHILS # BLD AUTO: 0.1 X10E3/UL (ref 0–0.2)
BASOPHILS NFR BLD AUTO: 1 %
BILIRUB SERPL-MCNC: 0.3 MG/DL (ref 0–1.2)
BUN SERPL-MCNC: 17 MG/DL (ref 6–24)
BUN/CREAT SERPL: 17 (ref 9–20)
CALCIUM SERPL-MCNC: 9.8 MG/DL (ref 8.7–10.2)
CHLORIDE SERPL-SCNC: 97 MMOL/L (ref 96–106)
CHOLEST SERPL-MCNC: 138 MG/DL (ref 100–199)
CO2 SERPL-SCNC: 23 MMOL/L (ref 20–29)
CREAT SERPL-MCNC: 1.01 MG/DL (ref 0.76–1.27)
CREAT UR-MCNC: 114.4 MG/DL
EGFRCR SERPLBLD CKD-EPI 2021: 91 ML/MIN/1.73
EOSINOPHIL # BLD AUTO: 1 X10E3/UL (ref 0–0.4)
EOSINOPHIL NFR BLD AUTO: 13 %
ERYTHROCYTE [DISTWIDTH] IN BLOOD BY AUTOMATED COUNT: 12.7 % (ref 11.6–15.4)
GLOBULIN SER CALC-MCNC: 3.1 G/DL (ref 1.5–4.5)
GLUCOSE SERPL-MCNC: 195 MG/DL (ref 70–99)
HBA1C MFR BLD: 9.4 % (ref 4.8–5.6)
HCT VFR BLD AUTO: 47 % (ref 37.5–51)
HDLC SERPL-MCNC: 26 MG/DL
HGB BLD-MCNC: 14.9 G/DL (ref 13–17.7)
IMM GRANULOCYTES # BLD AUTO: 0 X10E3/UL (ref 0–0.1)
IMM GRANULOCYTES NFR BLD AUTO: 0 %
LDLC SERPL CALC-MCNC: 78 MG/DL (ref 0–99)
LYMPHOCYTES # BLD AUTO: 1.5 X10E3/UL (ref 0.7–3.1)
LYMPHOCYTES NFR BLD AUTO: 19 %
MCH RBC QN AUTO: 28.9 PG (ref 26.6–33)
MCHC RBC AUTO-ENTMCNC: 31.7 G/DL (ref 31.5–35.7)
MCV RBC AUTO: 91 FL (ref 79–97)
MICROALBUMIN UR-MCNC: 18.9 UG/ML
MONOCYTES # BLD AUTO: 0.6 X10E3/UL (ref 0.1–0.9)
MONOCYTES NFR BLD AUTO: 8 %
NEUTROPHILS # BLD AUTO: 4.8 X10E3/UL (ref 1.4–7)
NEUTROPHILS NFR BLD AUTO: 59 %
PLATELET # BLD AUTO: 343 X10E3/UL (ref 150–450)
POTASSIUM SERPL-SCNC: 4.4 MMOL/L (ref 3.5–5.2)
PROT SERPL-MCNC: 7.5 G/DL (ref 6–8.5)
RBC # BLD AUTO: 5.16 X10E6/UL (ref 4.14–5.8)
SODIUM SERPL-SCNC: 137 MMOL/L (ref 134–144)
TRIGL SERPL-MCNC: 201 MG/DL (ref 0–149)
VLDLC SERPL CALC-MCNC: 34 MG/DL (ref 5–40)
WBC # BLD AUTO: 8 X10E3/UL (ref 3.4–10.8)

## 2025-02-14 RX ORDER — ORAL SEMAGLUTIDE 3 MG/1
3 TABLET ORAL DAILY
Qty: 30 TABLET | Refills: 0 | Status: SHIPPED | OUTPATIENT
Start: 2025-02-14

## 2025-02-14 NOTE — PROGRESS NOTES
I attempted to call him to discuss labs, did not reach him. If he calls back its ok to discuss with him that his A1c has increased. He refuses injectables. However I want to start him on oral rybelsus. We will start at 3mg daily for 1 month. Please verify that he has no family history of medullary thyroid cancer. He needs a one month follow up with me to discuss dose increase at that time. It is VERY VERY VERY important that he changes his diet like we talked about during visit.

## 2025-02-14 NOTE — TELEPHONE ENCOUNTER
Wil Middleton (Key: [id number])  Rx #: [id number]  Rybelsus 3MG tablets     [Alert] : alert [No Acute Distress] : no acute distress [Well Nourished] : well nourished [Well Developed] : well developed [Healthy Appearance] : healthy appearance [Normal Voice/Communication] : normal voice communication [EOMI] : extra ocular movement intact [No Proptosis] : no proptosis [Normal Outer Ear/Nose] : the ears and nose were normal in appearance [Thyroid Not Enlarged] : the thyroid was not enlarged [No Thyroid Nodules] : there were no palpable thyroid nodules [No Respiratory Distress] : no respiratory distress [No Accessory Muscle Use] : no accessory muscle use [Clear to Auscultation] : lungs were clear to auscultation bilaterally [Normal Rate] : heart rate was normal  [Normal S1, S2] : normal S1 and S2 [Regular Rhythm] : with a regular rhythm [Pedal Pulses Normal] : the pedal pulses are present [No Edema] : there was no peripheral edema [Not Distended] : not distended [Post Cervical Nodes] : posterior cervical nodes [No Spinal Tenderness] : no spinal tenderness [Spine Straight] : spine straight [No Stigmata of Cushings Syndrome] : no stigmata of cushings syndrome [Normal Gait] : normal gait [Normal Strength/Tone] : muscle strength and tone were normal [No Tremors] : no tremors [Oriented x3] : oriented to person, place, and time [Normal Insight/Judgement] : insight and judgment were intact [Normal Affect] : the affect was normal [Normal Mood] : the mood was normal [Acanthosis Nigricans] : no acanthosis nigricans

## 2025-04-15 DIAGNOSIS — E55.9 VITAMIN D DEFICIENCY: ICD-10-CM

## 2025-04-15 RX ORDER — ERGOCALCIFEROL 1.25 MG/1
50000 CAPSULE, LIQUID FILLED ORAL WEEKLY
Qty: 10 CAPSULE | Refills: 3 | Status: SHIPPED | OUTPATIENT
Start: 2025-04-15

## 2025-04-15 NOTE — TELEPHONE ENCOUNTER
Rx Refill Note  Requested Prescriptions     Pending Prescriptions Disp Refills    vitamin D (ERGOCALCIFEROL) 1.25 MG (02410 UT) capsule capsule 10 capsule 3     Sig: Take 1 capsule by mouth 1 (One) Time Per Week.      Last office visit with prescribing clinician: 2/13/2025   Last telemedicine visit with prescribing clinician: Visit date not found   Next office visit with prescribing clinician: 7/9/2025                         Would you like a call back once the refill request has been completed: [] Yes [] No    If the office needs to give you a call back, can they leave a voicemail: [] Yes [] No    Dian Bentley MA  04/15/25, 09:13 CDT

## 2025-07-03 DIAGNOSIS — I10 PRIMARY HYPERTENSION: ICD-10-CM

## 2025-07-03 RX ORDER — AMLODIPINE BESYLATE 5 MG/1
5 TABLET ORAL DAILY
Qty: 90 TABLET | Refills: 1 | Status: SHIPPED | OUTPATIENT
Start: 2025-07-03

## 2025-07-03 NOTE — TELEPHONE ENCOUNTER
Rx Refill Note  Requested Prescriptions     Pending Prescriptions Disp Refills    amLODIPine (NORVASC) 5 MG tablet 90 tablet 1     Sig: Take 1 tablet by mouth Daily.      Last office visit with prescribing clinician: 2/13/2025   Last telemedicine visit with prescribing clinician: Visit date not found   Next office visit with prescribing clinician: 7/9/2025                         Would you like a call back once the refill request has been completed: [] Yes [] No    If the office needs to give you a call back, can they leave a voicemail: [] Yes [] No    Dian Bentley MA  07/03/25, 10:48 CDT

## 2025-07-10 DIAGNOSIS — I10 PRIMARY HYPERTENSION: ICD-10-CM

## 2025-07-10 RX ORDER — VALSARTAN AND HYDROCHLOROTHIAZIDE 80; 12.5 MG/1; MG/1
1 TABLET, FILM COATED ORAL DAILY
Qty: 90 TABLET | Refills: 1 | Status: SHIPPED | OUTPATIENT
Start: 2025-07-10

## 2025-07-10 NOTE — TELEPHONE ENCOUNTER
Rx Refill Note  Requested Prescriptions     Pending Prescriptions Disp Refills    valsartan-hydrochlorothiazide (DIOVAN-HCT) 80-12.5 MG per tablet 90 tablet 1     Sig: Take 1 tablet by mouth Daily.      Last office visit with prescribing clinician: 2/13/2025   Last telemedicine visit with prescribing clinician: Visit date not found   Next office visit with prescribing clinician: Visit date not found                         Would you like a call back once the refill request has been completed: [] Yes [] No    If the office needs to give you a call back, can they leave a voicemail: [] Yes [] No    Dian Bentley MA  07/10/25, 14:53 CDT

## 2025-08-22 ENCOUNTER — HOSPITAL ENCOUNTER (EMERGENCY)
Facility: HOSPITAL | Age: 50
Discharge: LEFT BEFORE DISCHARGE | End: 2025-08-22
Payer: MEDICAID

## 2025-08-22 ENCOUNTER — APPOINTMENT (OUTPATIENT)
Dept: ULTRASOUND IMAGING | Facility: HOSPITAL | Age: 50
End: 2025-08-22
Payer: MEDICAID

## 2025-08-27 ENCOUNTER — OFFICE VISIT (OUTPATIENT)
Dept: INTERNAL MEDICINE | Facility: CLINIC | Age: 50
End: 2025-08-27
Payer: MEDICAID

## 2025-08-27 VITALS
BODY MASS INDEX: 32.34 KG/M2 | TEMPERATURE: 97.5 F | WEIGHT: 231 LBS | HEIGHT: 71 IN | RESPIRATION RATE: 18 BRPM | OXYGEN SATURATION: 97 % | SYSTOLIC BLOOD PRESSURE: 130 MMHG | HEART RATE: 103 BPM | DIASTOLIC BLOOD PRESSURE: 90 MMHG

## 2025-08-27 DIAGNOSIS — E11.65 TYPE 2 DIABETES MELLITUS WITH HYPERGLYCEMIA, WITHOUT LONG-TERM CURRENT USE OF INSULIN: ICD-10-CM

## 2025-08-27 DIAGNOSIS — R25.2 MUSCLE CRAMPS: ICD-10-CM

## 2025-08-27 DIAGNOSIS — I10 PRIMARY HYPERTENSION: ICD-10-CM

## 2025-08-27 DIAGNOSIS — E78.2 MIXED HYPERLIPIDEMIA: ICD-10-CM

## 2025-08-27 DIAGNOSIS — L03.115 CELLULITIS OF RIGHT LOWER EXTREMITY: ICD-10-CM

## 2025-08-27 DIAGNOSIS — Z00.00 ENCOUNTER FOR ANNUAL HEALTH EXAMINATION: Primary | ICD-10-CM

## 2025-08-27 DIAGNOSIS — H53.9 CHANGES IN VISION: ICD-10-CM

## 2025-08-27 DIAGNOSIS — E55.9 VITAMIN D DEFICIENCY: ICD-10-CM

## 2025-08-27 PROCEDURE — 3046F HEMOGLOBIN A1C LEVEL >9.0%: CPT

## 2025-08-27 PROCEDURE — 1160F RVW MEDS BY RX/DR IN RCRD: CPT

## 2025-08-27 PROCEDURE — 1125F AMNT PAIN NOTED PAIN PRSNT: CPT

## 2025-08-27 PROCEDURE — 1159F MED LIST DOCD IN RCRD: CPT

## 2025-08-27 PROCEDURE — 99396 PREV VISIT EST AGE 40-64: CPT

## 2025-08-27 RX ORDER — LANCETS 28 GAUGE
EACH MISCELLANEOUS
Qty: 100 EACH | Refills: 12 | Status: SHIPPED | OUTPATIENT
Start: 2025-08-27

## 2025-08-28 DIAGNOSIS — E11.65 TYPE 2 DIABETES MELLITUS WITH HYPERGLYCEMIA, WITHOUT LONG-TERM CURRENT USE OF INSULIN: Primary | ICD-10-CM

## 2025-08-28 LAB
25(OH)D3+25(OH)D2 SERPL-MCNC: 25.2 NG/ML (ref 30–100)
ALBUMIN SERPL-MCNC: 4 G/DL (ref 4.1–5.1)
ALP SERPL-CCNC: 165 IU/L (ref 44–121)
ALT SERPL-CCNC: 45 IU/L (ref 0–44)
AST SERPL-CCNC: 27 IU/L (ref 0–40)
BASOPHILS # BLD AUTO: 0.1 X10E3/UL (ref 0–0.2)
BASOPHILS NFR BLD AUTO: 1 %
BILIRUB SERPL-MCNC: 0.5 MG/DL (ref 0–1.2)
BUN SERPL-MCNC: 21 MG/DL (ref 6–24)
BUN/CREAT SERPL: 25 (ref 9–20)
CALCIUM SERPL-MCNC: 9.7 MG/DL (ref 8.7–10.2)
CHLORIDE SERPL-SCNC: 91 MMOL/L (ref 96–106)
CHOLEST SERPL-MCNC: 184 MG/DL (ref 100–199)
CO2 SERPL-SCNC: 23 MMOL/L (ref 20–29)
CREAT SERPL-MCNC: 0.85 MG/DL (ref 0.76–1.27)
EGFRCR SERPLBLD CKD-EPI 2021: 106 ML/MIN/1.73
EOSINOPHIL # BLD AUTO: 0.1 X10E3/UL (ref 0–0.4)
EOSINOPHIL NFR BLD AUTO: 1 %
ERYTHROCYTE [DISTWIDTH] IN BLOOD BY AUTOMATED COUNT: 12.7 % (ref 11.6–15.4)
GLOBULIN SER CALC-MCNC: 3.3 G/DL (ref 1.5–4.5)
GLUCOSE SERPL-MCNC: 422 MG/DL (ref 70–99)
HBA1C MFR BLD: >15.5 % (ref 4.8–5.6)
HCT VFR BLD AUTO: 53.5 % (ref 37.5–51)
HDLC SERPL-MCNC: 27 MG/DL
HGB BLD-MCNC: 17.4 G/DL (ref 13–17.7)
IMM GRANULOCYTES # BLD AUTO: 0 X10E3/UL (ref 0–0.1)
IMM GRANULOCYTES NFR BLD AUTO: 0 %
LDLC SERPL CALC-MCNC: 108 MG/DL (ref 0–99)
LYMPHOCYTES # BLD AUTO: 1.5 X10E3/UL (ref 0.7–3.1)
LYMPHOCYTES NFR BLD AUTO: 19 %
MAGNESIUM SERPL-MCNC: 2 MG/DL (ref 1.6–2.3)
MCH RBC QN AUTO: 29.7 PG (ref 26.6–33)
MCHC RBC AUTO-ENTMCNC: 32.5 G/DL (ref 31.5–35.7)
MCV RBC AUTO: 91 FL (ref 79–97)
MONOCYTES # BLD AUTO: 0.5 X10E3/UL (ref 0.1–0.9)
MONOCYTES NFR BLD AUTO: 6 %
NEUTROPHILS # BLD AUTO: 5.7 X10E3/UL (ref 1.4–7)
NEUTROPHILS NFR BLD AUTO: 73 %
PHOSPHATE SERPL-MCNC: 3.7 MG/DL (ref 2.8–4.1)
PLATELET # BLD AUTO: 360 X10E3/UL (ref 150–450)
POTASSIUM SERPL-SCNC: 5.3 MMOL/L (ref 3.5–5.2)
PROT SERPL-MCNC: 7.3 G/DL (ref 6–8.5)
RBC # BLD AUTO: 5.86 X10E6/UL (ref 4.14–5.8)
SODIUM SERPL-SCNC: 128 MMOL/L (ref 134–144)
T3FREE SERPL-MCNC: 3 PG/ML (ref 2–4.4)
T4 FREE SERPL-MCNC: 1.01 NG/DL (ref 0.82–1.77)
TRIGL SERPL-MCNC: 284 MG/DL (ref 0–149)
TSH SERPL DL<=0.005 MIU/L-ACNC: 1.96 UIU/ML (ref 0.45–4.5)
VIT B12 SERPL-MCNC: 631 PG/ML (ref 232–1245)
VLDLC SERPL CALC-MCNC: 49 MG/DL (ref 5–40)
WBC # BLD AUTO: 7.8 X10E3/UL (ref 3.4–10.8)

## 2025-08-28 RX ORDER — SEMAGLUTIDE 0.68 MG/ML
0.25 INJECTION, SOLUTION SUBCUTANEOUS WEEKLY
Qty: 3 ML | Refills: 0 | Status: SHIPPED | OUTPATIENT
Start: 2025-08-28

## 2025-08-29 DIAGNOSIS — E11.65 TYPE 2 DIABETES MELLITUS WITH HYPERGLYCEMIA, WITHOUT LONG-TERM CURRENT USE OF INSULIN: Primary | ICD-10-CM

## (undated) DEVICE — GAUZE,SPONGE,4"X4",16PLY,XRAY,STRL,LF: Brand: MEDLINE

## (undated) DEVICE — 9165 UNIVERSAL PATIENT PLATE: Brand: 3M™

## (undated) DEVICE — AIRWAY CIRCUIT: Brand: DEROYAL

## (undated) DEVICE — MASK ANES AD M SZ 5 PREM TAIL VLV INFL PRT UNSCENTED HK RNG

## (undated) DEVICE — CONMED GOLDLINE ELECTROSURGICAL HANDPIECE, HAND CONTROLLED WITH BLADE ELECTRODE, BUTTON SWITCH, SAFETY HOLSTER AND 10 FT (3 M) CABLE: Brand: CONMED GOLDLINE

## (undated) DEVICE — Z DISCONTINUED USE 2271144 DRAIN SURG W0.25XL18IN FLAT GRAV FOR OPN WND PENROSE

## (undated) DEVICE — DRAPE,EENT,SPLIT,STERILE: Brand: MEDLINE

## (undated) DEVICE — MAJOR BSIN SETUP PK